# Patient Record
Sex: FEMALE | Race: WHITE | NOT HISPANIC OR LATINO | Employment: UNEMPLOYED | URBAN - NONMETROPOLITAN AREA
[De-identification: names, ages, dates, MRNs, and addresses within clinical notes are randomized per-mention and may not be internally consistent; named-entity substitution may affect disease eponyms.]

---

## 2017-04-03 ENCOUNTER — TELEPHONE (OUTPATIENT)
Dept: PSYCHIATRY | Facility: CLINIC | Age: 27
End: 2017-04-03

## 2017-04-03 NOTE — TELEPHONE ENCOUNTER
Dr. Gagan Peguero called and wanted to schedule an appointment with you. You have not seen her since 02/15/16 and she no showed with you on 02/25/16. Her chart has been closed since it's been over a year since you saw her. Do you want to schedule her or refer her to one of the NP's, but if she is wanting Suboxone they can't prescribe that. She told me she wasn't on any meds.

## 2017-04-04 NOTE — TELEPHONE ENCOUNTER
Called patient and made her aware that Dr. Farah could not take her back as a patient but she can schedule with a NP and she didn't want the appointment unless she could see a psychiatrist

## 2017-06-16 ENCOUNTER — HOSPITAL ENCOUNTER (EMERGENCY)
Facility: HOSPITAL | Age: 27
Discharge: LEFT BEFORE DISCHARGE | End: 2017-06-16

## 2017-06-16 VITALS
TEMPERATURE: 98.1 F | HEART RATE: 61 BPM | RESPIRATION RATE: 16 BRPM | OXYGEN SATURATION: 96 % | WEIGHT: 162 LBS | DIASTOLIC BLOOD PRESSURE: 74 MMHG | HEIGHT: 63 IN | BODY MASS INDEX: 28.7 KG/M2 | SYSTOLIC BLOOD PRESSURE: 108 MMHG

## 2017-06-16 PROCEDURE — 99211 OFF/OP EST MAY X REQ PHY/QHP: CPT

## 2017-06-17 NOTE — ED NOTES
pt was unable to be found in ED lobby, in bathroom or outside of ED doors. Pt appears to have left without notifying staff and without being seen.          Nayeli Morin RN  06/17/17 1943

## 2017-10-23 ENCOUNTER — HOSPITAL ENCOUNTER (EMERGENCY)
Facility: HOSPITAL | Age: 27
End: 2017-10-23

## 2017-11-12 ENCOUNTER — HOSPITAL ENCOUNTER (EMERGENCY)
Facility: HOSPITAL | Age: 27
Discharge: HOME | End: 2017-11-12
Attending: EMERGENCY MEDICINE | Admitting: EMERGENCY MEDICINE

## 2017-11-12 ENCOUNTER — APPOINTMENT (OUTPATIENT)
Dept: GENERAL RADIOLOGY | Facility: HOSPITAL | Age: 27
End: 2017-11-12

## 2017-11-12 VITALS
TEMPERATURE: 97.1 F | HEIGHT: 64 IN | SYSTOLIC BLOOD PRESSURE: 100 MMHG | RESPIRATION RATE: 18 BRPM | HEART RATE: 83 BPM | WEIGHT: 145 LBS | DIASTOLIC BLOOD PRESSURE: 70 MMHG | BODY MASS INDEX: 24.75 KG/M2 | OXYGEN SATURATION: 97 %

## 2017-11-12 DIAGNOSIS — L03.113 CELLULITIS OF RIGHT HAND: Primary | ICD-10-CM

## 2017-11-12 DIAGNOSIS — N39.0 ACUTE UTI: ICD-10-CM

## 2017-11-12 LAB
6-ACETYL MORPHINE: NEGATIVE
ALBUMIN SERPL-MCNC: 3.9 G/DL (ref 3.5–5)
ALBUMIN/GLOB SERPL: 1 G/DL (ref 1.5–2.5)
ALP SERPL-CCNC: 84 U/L (ref 35–104)
ALT SERPL W P-5'-P-CCNC: 12 U/L (ref 10–36)
AMPHET+METHAMPHET UR QL: NEGATIVE
ANION GAP SERPL CALCULATED.3IONS-SCNC: 5.2 MMOL/L (ref 3.6–11.2)
AST SERPL-CCNC: 18 U/L (ref 10–30)
BACTERIA UR QL AUTO: ABNORMAL /HPF
BARBITURATES UR QL SCN: POSITIVE
BASOPHILS # BLD AUTO: 0.02 10*3/MM3 (ref 0–0.3)
BASOPHILS NFR BLD AUTO: 0.4 % (ref 0–2)
BENZODIAZ UR QL SCN: NEGATIVE
BILIRUB SERPL-MCNC: 0.1 MG/DL (ref 0.2–1.8)
BILIRUB UR QL STRIP: NEGATIVE
BUN BLD-MCNC: 6 MG/DL (ref 7–21)
BUN/CREAT SERPL: 13 (ref 7–25)
BUPRENORPHINE SERPL-MCNC: NEGATIVE NG/ML
CALCIUM SPEC-SCNC: 9.1 MG/DL (ref 7.7–10)
CANNABINOIDS SERPL QL: NEGATIVE
CHLORIDE SERPL-SCNC: 108 MMOL/L (ref 99–112)
CLARITY UR: ABNORMAL
CO2 SERPL-SCNC: 24.8 MMOL/L (ref 24.3–31.9)
COCAINE UR QL: NEGATIVE
COLOR UR: ABNORMAL
CREAT BLD-MCNC: 0.46 MG/DL (ref 0.43–1.29)
CRP SERPL-MCNC: 3.44 MG/DL (ref 0–0.99)
DEPRECATED RDW RBC AUTO: 45.7 FL (ref 37–54)
EOSINOPHIL # BLD AUTO: 0.06 10*3/MM3 (ref 0–0.7)
EOSINOPHIL NFR BLD AUTO: 1.3 % (ref 0–5)
ERYTHROCYTE [DISTWIDTH] IN BLOOD BY AUTOMATED COUNT: 15.1 % (ref 11.5–14.5)
ERYTHROCYTE [SEDIMENTATION RATE] IN BLOOD: 46 MM/HR (ref 0–20)
GFR SERPL CREATININE-BSD FRML MDRD: >150 ML/MIN/1.73
GLOBULIN UR ELPH-MCNC: 3.8 GM/DL
GLUCOSE BLD-MCNC: 90 MG/DL (ref 70–110)
GLUCOSE UR STRIP-MCNC: NEGATIVE MG/DL
HCT VFR BLD AUTO: 35.1 % (ref 37–47)
HGB BLD-MCNC: 11.1 G/DL (ref 12–16)
HGB UR QL STRIP.AUTO: NEGATIVE
HYALINE CASTS UR QL AUTO: ABNORMAL /LPF
IMM GRANULOCYTES # BLD: 0 10*3/MM3 (ref 0–0.03)
IMM GRANULOCYTES NFR BLD: 0 % (ref 0–0.5)
KETONES UR QL STRIP: ABNORMAL
LEUKOCYTE ESTERASE UR QL STRIP.AUTO: ABNORMAL
LYMPHOCYTES # BLD AUTO: 1.63 10*3/MM3 (ref 1–3)
LYMPHOCYTES NFR BLD AUTO: 35.4 % (ref 21–51)
MCH RBC QN AUTO: 26.6 PG (ref 27–33)
MCHC RBC AUTO-ENTMCNC: 31.6 G/DL (ref 33–37)
MCV RBC AUTO: 84.2 FL (ref 80–94)
METHADONE UR QL SCN: NEGATIVE
MONOCYTES # BLD AUTO: 0.52 10*3/MM3 (ref 0.1–0.9)
MONOCYTES NFR BLD AUTO: 11.3 % (ref 0–10)
NEUTROPHILS # BLD AUTO: 2.37 10*3/MM3 (ref 1.4–6.5)
NEUTROPHILS NFR BLD AUTO: 51.6 % (ref 30–70)
NITRITE UR QL STRIP: NEGATIVE
OPIATES UR QL: POSITIVE
OSMOLALITY SERPL CALC.SUM OF ELEC: 272.8 MOSM/KG (ref 273–305)
OXYCODONE UR QL SCN: POSITIVE
PCP UR QL SCN: NEGATIVE
PH UR STRIP.AUTO: 6 [PH] (ref 5–8)
PLATELET # BLD AUTO: 247 10*3/MM3 (ref 130–400)
PMV BLD AUTO: 10.9 FL (ref 6–10)
POTASSIUM BLD-SCNC: 3.9 MMOL/L (ref 3.5–5.3)
PROT SERPL-MCNC: 7.7 G/DL (ref 6–8)
PROT UR QL STRIP: ABNORMAL
RBC # BLD AUTO: 4.17 10*6/MM3 (ref 4.2–5.4)
RBC # UR: ABNORMAL /HPF
REF LAB TEST METHOD: ABNORMAL
SODIUM BLD-SCNC: 138 MMOL/L (ref 135–153)
SP GR UR STRIP: >=1.03 (ref 1–1.03)
SQUAMOUS #/AREA URNS HPF: ABNORMAL /HPF
UROBILINOGEN UR QL STRIP: ABNORMAL
WBC NRBC COR # BLD: 4.6 10*3/MM3 (ref 4.5–12.5)
WBC UR QL AUTO: ABNORMAL /HPF

## 2017-11-12 PROCEDURE — 81001 URINALYSIS AUTO W/SCOPE: CPT | Performed by: PHYSICIAN ASSISTANT

## 2017-11-12 PROCEDURE — 80307 DRUG TEST PRSMV CHEM ANLYZR: CPT | Performed by: PHYSICIAN ASSISTANT

## 2017-11-12 PROCEDURE — 86140 C-REACTIVE PROTEIN: CPT | Performed by: PHYSICIAN ASSISTANT

## 2017-11-12 PROCEDURE — 73130 X-RAY EXAM OF HAND: CPT | Performed by: RADIOLOGY

## 2017-11-12 PROCEDURE — 85025 COMPLETE CBC W/AUTO DIFF WBC: CPT | Performed by: PHYSICIAN ASSISTANT

## 2017-11-12 PROCEDURE — 80053 COMPREHEN METABOLIC PANEL: CPT | Performed by: PHYSICIAN ASSISTANT

## 2017-11-12 PROCEDURE — 99283 EMERGENCY DEPT VISIT LOW MDM: CPT

## 2017-11-12 PROCEDURE — 85652 RBC SED RATE AUTOMATED: CPT | Performed by: PHYSICIAN ASSISTANT

## 2017-11-12 PROCEDURE — 87086 URINE CULTURE/COLONY COUNT: CPT | Performed by: PHYSICIAN ASSISTANT

## 2017-11-12 PROCEDURE — 73130 X-RAY EXAM OF HAND: CPT

## 2017-11-12 RX ORDER — IBUPROFEN 600 MG/1
600 TABLET ORAL EVERY 6 HOURS PRN
Qty: 60 TABLET | Refills: 0 | Status: SHIPPED | OUTPATIENT
Start: 2017-11-12

## 2017-11-12 RX ORDER — METHOCARBAMOL 500 MG/1
500 TABLET, FILM COATED ORAL DAILY
COMMUNITY

## 2017-11-12 RX ORDER — DOXYCYCLINE 100 MG/1
100 CAPSULE ORAL 2 TIMES DAILY
Qty: 20 CAPSULE | Refills: 0 | Status: SHIPPED | OUTPATIENT
Start: 2017-11-12

## 2017-11-12 RX ORDER — CLONIDINE HYDROCHLORIDE 0.1 MG/1
0.1 TABLET ORAL 2 TIMES DAILY
COMMUNITY

## 2017-11-12 RX ORDER — BUTALBITAL, ACETAMINOPHEN AND CAFFEINE 50; 325; 40 MG/1; MG/1; MG/1
1 TABLET ORAL EVERY 4 HOURS PRN
COMMUNITY

## 2017-11-12 RX ORDER — TIZANIDINE 4 MG/1
4 TABLET ORAL NIGHTLY PRN
COMMUNITY

## 2017-11-12 NOTE — ED PROVIDER NOTES
Subjective   HPI Comments: 27-year-old female comes in with chief complaint right hand numbness, throbbing, pain.  Patient states that approximately 2 weeks ago she injected IV drug use and to her right hand.  States she's had increase swelling, pain in the hand.  There is been no obvious abscess or infection.  She does states she's had numbness and tingling in her fingertips.    Patient is a 27 y.o. female presenting with upper extremity pain.   History provided by:  Patient   used: No    Upper Extremity Issue   Location:  Hand  Hand location:  R hand  Injury: yes    Time since incident:  2 days  Mechanism of injury comment:  IV drugs   Pain details:     Quality:  Throbbing and shooting    Radiates to:  Does not radiate    Severity:  Moderate    Onset quality:  Sudden    Duration:  2 days    Timing:  Constant  Dislocation: no    Foreign body present:  No foreign bodies  Tetanus status:  Unknown  Prior injury to area:  No  Relieved by:  Nothing  Worsened by:  Nothing  Associated symptoms: muscle weakness, numbness, stiffness and swelling    Associated symptoms: no back pain, no decreased range of motion and no fatigue    Risk factors: no concern for non-accidental trauma, no known bone disorder, no frequent fractures and no recent illness        Review of Systems   Constitutional: Negative for fatigue.   Musculoskeletal: Positive for stiffness. Negative for back pain.   Skin: Positive for wound.   Neurological: Positive for weakness.   All other systems reviewed and are negative.      Past Medical History:   Diagnosis Date   • Anxiety    • Arthritis    • Injury of back    • Migraine        Allergies   Allergen Reactions   • Cephalexin    • Tramadol Hcl    • Bactrim [Sulfamethoxazole-Trimethoprim] Rash       History reviewed. No pertinent surgical history.    Family History   Problem Relation Age of Onset   • Anxiety disorder Mother    • Arthritis Mother    • Heart disease Paternal Grandfather     • Stroke Paternal Grandfather    • Lung disease Paternal Grandmother    • Breast cancer Maternal Grandmother    • Anxiety disorder Maternal Grandmother    • Arthritis Maternal Grandmother    • Hypertension Maternal Grandmother        Social History     Social History   • Marital status:      Spouse name: N/A   • Number of children: N/A   • Years of education: N/A     Social History Main Topics   • Smoking status: Current Every Day Smoker     Packs/day: 0.50   • Smokeless tobacco: Never Used   • Alcohol use No   • Drug use: Yes     Special: IV      Comment: oxycodone 30mg iv daily but trying to quit; last intake approx 2wks ago; snorted last 3 days ago   • Sexual activity: No     Other Topics Concern   • None     Social History Narrative   • None           Objective   Physical Exam   Constitutional: She is oriented to person, place, and time. She appears well-developed and well-nourished.   HENT:   Head: Normocephalic.   Right Ear: External ear normal.   Left Ear: External ear normal.   Nose: Nose normal.   Mouth/Throat: Oropharynx is clear and moist.   Eyes: Conjunctivae and EOM are normal. Pupils are equal, round, and reactive to light.   Neck: Normal range of motion. Neck supple. No tracheal deviation present. No thyromegaly present.   Cardiovascular: Normal rate, regular rhythm, normal heart sounds and intact distal pulses.    Pulmonary/Chest: Effort normal and breath sounds normal.   Abdominal: Soft. Bowel sounds are normal.   Musculoskeletal: She exhibits edema and tenderness.        Right hand: She exhibits decreased range of motion and tenderness. She exhibits no deformity.   Neurological: She is alert and oriented to person, place, and time. She has normal reflexes.   Skin: Skin is warm and dry.   Psychiatric: She has a normal mood and affect. Her behavior is normal. Judgment and thought content normal.   Nursing note and vitals reviewed.      Procedures         ED Course  ED Course   Comment By  Time   This is a 27-year-old female comes in with chief complaint right hand swelling and tingling in her fingertips.  Patient does report she attempted to inject IV drugs several weeks ago. Patient will be started on abx for mild cellulitis and the swelling, as well as UTI. Advised to return if condition worsens. Steven Sesay PA-C 11/12 2052                  MDM  Number of Diagnoses or Management Options  Acute UTI: new and requires workup  Cellulitis of right hand: new and requires workup     Amount and/or Complexity of Data Reviewed  Independent visualization of images, tracings, or specimens: yes    Risk of Complications, Morbidity, and/or Mortality  Presenting problems: moderate  Diagnostic procedures: moderate  Management options: moderate    Patient Progress  Patient progress: stable      Final diagnoses:   Cellulitis of right hand   Acute UTI            Steven Sesay PA-C  11/12/17 2057       Steven Sesay PA-C  11/12/17 2109

## 2017-11-15 LAB — BACTERIA SPEC AEROBE CULT: NORMAL

## 2018-11-23 ENCOUNTER — HOSPITAL ENCOUNTER (EMERGENCY)
Facility: HOSPITAL | Age: 28
Discharge: LEFT BEFORE DISCHARGE | End: 2018-11-24

## 2025-03-27 ENCOUNTER — HOSPITAL ENCOUNTER (INPATIENT)
Facility: HOSPITAL | Age: 35
LOS: 2 days | Discharge: HOME | DRG: 690 | End: 2025-03-30
Attending: EMERGENCY MEDICINE | Admitting: HOSPITALIST
Payer: MEDICAID

## 2025-03-27 ENCOUNTER — APPOINTMENT (OUTPATIENT)
Dept: CT IMAGING | Facility: HOSPITAL | Age: 35
DRG: 690 | End: 2025-03-27
Payer: MEDICAID

## 2025-03-27 DIAGNOSIS — N10 ACUTE PYELONEPHRITIS: Primary | ICD-10-CM

## 2025-03-27 PROCEDURE — 99285 EMERGENCY DEPT VISIT HI MDM: CPT

## 2025-03-27 RX ORDER — ONDANSETRON 4 MG/1
4 TABLET, ORALLY DISINTEGRATING ORAL ONCE
Status: COMPLETED | OUTPATIENT
Start: 2025-03-28 | End: 2025-03-28

## 2025-03-27 RX ORDER — SODIUM CHLORIDE 0.9 % (FLUSH) 0.9 %
10 SYRINGE (ML) INJECTION AS NEEDED
Status: DISCONTINUED | OUTPATIENT
Start: 2025-03-27 | End: 2025-03-27

## 2025-03-27 RX ORDER — KETOROLAC TROMETHAMINE 30 MG/ML
60 INJECTION, SOLUTION INTRAMUSCULAR; INTRAVENOUS ONCE
Status: COMPLETED | OUTPATIENT
Start: 2025-03-28 | End: 2025-03-28

## 2025-03-27 RX ORDER — ONDANSETRON 2 MG/ML
4 INJECTION INTRAMUSCULAR; INTRAVENOUS ONCE
Status: DISCONTINUED | OUTPATIENT
Start: 2025-03-27 | End: 2025-03-27

## 2025-03-27 RX ORDER — KETOROLAC TROMETHAMINE 30 MG/ML
30 INJECTION, SOLUTION INTRAMUSCULAR; INTRAVENOUS ONCE
Status: DISCONTINUED | OUTPATIENT
Start: 2025-03-27 | End: 2025-03-27

## 2025-03-28 ENCOUNTER — APPOINTMENT (OUTPATIENT)
Dept: ULTRASOUND IMAGING | Facility: HOSPITAL | Age: 35
DRG: 690 | End: 2025-03-28
Payer: MEDICAID

## 2025-03-28 ENCOUNTER — APPOINTMENT (OUTPATIENT)
Dept: CT IMAGING | Facility: HOSPITAL | Age: 35
DRG: 690 | End: 2025-03-28
Payer: MEDICAID

## 2025-03-28 PROBLEM — N12 PYELONEPHRITIS: Status: ACTIVE | Noted: 2025-03-28

## 2025-03-28 LAB
ALBUMIN SERPL-MCNC: 2 G/DL (ref 3.5–5.2)
ALBUMIN SERPL-MCNC: 2.7 G/DL (ref 3.5–5.2)
ALBUMIN/GLOB SERPL: 0.6 G/DL
ALBUMIN/GLOB SERPL: 0.8 G/DL
ALP SERPL-CCNC: 131 U/L (ref 39–117)
ALP SERPL-CCNC: 166 U/L (ref 39–117)
ALT SERPL W P-5'-P-CCNC: 17 U/L (ref 1–33)
ALT SERPL W P-5'-P-CCNC: 18 U/L (ref 1–33)
AMPHET+METHAMPHET UR QL: POSITIVE
AMPHETAMINES UR QL: POSITIVE
ANION GAP SERPL CALCULATED.3IONS-SCNC: 11.9 MMOL/L (ref 5–15)
ANION GAP SERPL CALCULATED.3IONS-SCNC: 12.2 MMOL/L (ref 5–15)
AST SERPL-CCNC: 19 U/L (ref 1–32)
AST SERPL-CCNC: 20 U/L (ref 1–32)
BACTERIA UR QL AUTO: ABNORMAL /HPF
BARBITURATES UR QL SCN: NEGATIVE
BASOPHILS # BLD AUTO: 0.04 10*3/MM3 (ref 0–0.2)
BASOPHILS # BLD AUTO: 0.04 10*3/MM3 (ref 0–0.2)
BASOPHILS NFR BLD AUTO: 0.4 % (ref 0–1.5)
BASOPHILS NFR BLD AUTO: 0.8 % (ref 0–1.5)
BENZODIAZ UR QL SCN: NEGATIVE
BILIRUB SERPL-MCNC: 0.3 MG/DL (ref 0–1.2)
BILIRUB SERPL-MCNC: 0.3 MG/DL (ref 0–1.2)
BILIRUB UR QL STRIP: NEGATIVE
BUN SERPL-MCNC: 19 MG/DL (ref 6–20)
BUN SERPL-MCNC: 20 MG/DL (ref 6–20)
BUN/CREAT SERPL: 13.7 (ref 7–25)
BUN/CREAT SERPL: 14 (ref 7–25)
BUPRENORPHINE SERPL-MCNC: POSITIVE NG/ML
BURR CELLS BLD QL SMEAR: NORMAL
CALCIUM SPEC-SCNC: 7.9 MG/DL (ref 8.6–10.5)
CALCIUM SPEC-SCNC: 8.6 MG/DL (ref 8.6–10.5)
CANNABINOIDS SERPL QL: NEGATIVE
CHLORIDE SERPL-SCNC: 105 MMOL/L (ref 98–107)
CHLORIDE SERPL-SCNC: 109 MMOL/L (ref 98–107)
CLARITY UR: ABNORMAL
CO2 SERPL-SCNC: 16.8 MMOL/L (ref 22–29)
CO2 SERPL-SCNC: 22.1 MMOL/L (ref 22–29)
COCAINE UR QL: NEGATIVE
COLOR UR: YELLOW
CREAT SERPL-MCNC: 1.36 MG/DL (ref 0.57–1)
CREAT SERPL-MCNC: 1.46 MG/DL (ref 0.57–1)
CRP SERPL-MCNC: 17.83 MG/DL (ref 0–0.5)
CRP SERPL-MCNC: 22.51 MG/DL (ref 0–0.5)
D-LACTATE SERPL-SCNC: 0.8 MMOL/L (ref 0.5–2)
DEPRECATED RDW RBC AUTO: 42.5 FL (ref 37–54)
DEPRECATED RDW RBC AUTO: 46.8 FL (ref 37–54)
DOHLE BOD BLD QL SMEAR: PRESENT
EGFRCR SERPLBLD CKD-EPI 2021: 47.6 ML/MIN/1.73
EGFRCR SERPLBLD CKD-EPI 2021: 51.9 ML/MIN/1.73
EOSINOPHIL # BLD AUTO: 0.04 10*3/MM3 (ref 0–0.4)
EOSINOPHIL # BLD AUTO: 0.08 10*3/MM3 (ref 0–0.4)
EOSINOPHIL NFR BLD AUTO: 0.8 % (ref 0.3–6.2)
EOSINOPHIL NFR BLD AUTO: 0.9 % (ref 0.3–6.2)
ERYTHROCYTE [DISTWIDTH] IN BLOOD BY AUTOMATED COUNT: 14.2 % (ref 12.3–15.4)
ERYTHROCYTE [DISTWIDTH] IN BLOOD BY AUTOMATED COUNT: 14.6 % (ref 12.3–15.4)
FENTANYL UR-MCNC: NEGATIVE NG/ML
GLOBULIN UR ELPH-MCNC: 3.5 GM/DL
GLOBULIN UR ELPH-MCNC: 3.6 GM/DL
GLUCOSE SERPL-MCNC: 120 MG/DL (ref 65–99)
GLUCOSE SERPL-MCNC: 138 MG/DL (ref 65–99)
GLUCOSE UR STRIP-MCNC: NEGATIVE MG/DL
HCG SERPL QL: NEGATIVE
HCT VFR BLD AUTO: 30.7 % (ref 34–46.6)
HCT VFR BLD AUTO: 46.9 % (ref 34–46.6)
HGB BLD-MCNC: 15.4 G/DL (ref 12–15.9)
HGB BLD-MCNC: 9.7 G/DL (ref 12–15.9)
HGB UR QL STRIP.AUTO: ABNORMAL
HOLD SPECIMEN: NORMAL
HYALINE CASTS UR QL AUTO: ABNORMAL /LPF
HYPOCHROMIA BLD QL: NORMAL
IMM GRANULOCYTES # BLD AUTO: 0.02 10*3/MM3 (ref 0–0.05)
IMM GRANULOCYTES # BLD AUTO: 0.05 10*3/MM3 (ref 0–0.05)
IMM GRANULOCYTES NFR BLD AUTO: 0.4 % (ref 0–0.5)
IMM GRANULOCYTES NFR BLD AUTO: 0.5 % (ref 0–0.5)
KETONES UR QL STRIP: NEGATIVE
LEUKOCYTE ESTERASE UR QL STRIP.AUTO: ABNORMAL
LIPASE SERPL-CCNC: 8 U/L (ref 13–60)
LYMPHOCYTES # BLD AUTO: 0.31 10*3/MM3 (ref 0.7–3.1)
LYMPHOCYTES # BLD AUTO: 0.59 10*3/MM3 (ref 0.7–3.1)
LYMPHOCYTES NFR BLD AUTO: 5.9 % (ref 19.6–45.3)
LYMPHOCYTES NFR BLD AUTO: 6.4 % (ref 19.6–45.3)
MAGNESIUM SERPL-MCNC: 1.8 MG/DL (ref 1.6–2.6)
MCH RBC QN AUTO: 27.1 PG (ref 26.6–33)
MCH RBC QN AUTO: 27.4 PG (ref 26.6–33)
MCHC RBC AUTO-ENTMCNC: 31.6 G/DL (ref 31.5–35.7)
MCHC RBC AUTO-ENTMCNC: 32.8 G/DL (ref 31.5–35.7)
MCV RBC AUTO: 82.4 FL (ref 79–97)
MCV RBC AUTO: 86.7 FL (ref 79–97)
METHADONE UR QL SCN: NEGATIVE
MONOCYTES # BLD AUTO: 0.5 10*3/MM3 (ref 0.1–0.9)
MONOCYTES # BLD AUTO: 0.6 10*3/MM3 (ref 0.1–0.9)
MONOCYTES NFR BLD AUTO: 6.5 % (ref 5–12)
MONOCYTES NFR BLD AUTO: 9.5 % (ref 5–12)
NEUTROPHILS NFR BLD AUTO: 4.36 10*3/MM3 (ref 1.7–7)
NEUTROPHILS NFR BLD AUTO: 7.89 10*3/MM3 (ref 1.7–7)
NEUTROPHILS NFR BLD AUTO: 82.6 % (ref 42.7–76)
NEUTROPHILS NFR BLD AUTO: 85.3 % (ref 42.7–76)
NITRITE UR QL STRIP: POSITIVE
NRBC BLD AUTO-RTO: 0 /100 WBC (ref 0–0.2)
NRBC BLD AUTO-RTO: 0 /100 WBC (ref 0–0.2)
OPIATES UR QL: NEGATIVE
OXYCODONE UR QL SCN: NEGATIVE
PCP UR QL SCN: NEGATIVE
PH UR STRIP.AUTO: 6 [PH] (ref 5–8)
PLAT MORPH BLD: NORMAL
PLAT MORPH BLD: NORMAL
PLATELET # BLD AUTO: 127 10*3/MM3 (ref 140–450)
PLATELET # BLD AUTO: 185 10*3/MM3 (ref 140–450)
PMV BLD AUTO: 10.4 FL (ref 6–12)
PMV BLD AUTO: 10.7 FL (ref 6–12)
POTASSIUM SERPL-SCNC: 2.8 MMOL/L (ref 3.5–5.2)
POTASSIUM SERPL-SCNC: 3.4 MMOL/L (ref 3.5–5.2)
PROCALCITONIN SERPL-MCNC: 4.86 NG/ML (ref 0–0.25)
PROT SERPL-MCNC: 5.6 G/DL (ref 6–8.5)
PROT SERPL-MCNC: 6.2 G/DL (ref 6–8.5)
PROT UR QL STRIP: ABNORMAL
QT INTERVAL: 330 MS
QTC INTERVAL: 423 MS
RBC # BLD AUTO: 3.54 10*6/MM3 (ref 3.77–5.28)
RBC # BLD AUTO: 5.69 10*6/MM3 (ref 3.77–5.28)
RBC # UR STRIP: ABNORMAL /HPF
RBC MORPH BLD: NORMAL
REF LAB TEST METHOD: ABNORMAL
SODIUM SERPL-SCNC: 138 MMOL/L (ref 136–145)
SODIUM SERPL-SCNC: 139 MMOL/L (ref 136–145)
SP GR UR STRIP: 1.02 (ref 1–1.03)
SQUAMOUS #/AREA URNS HPF: ABNORMAL /HPF
TRICYCLICS UR QL SCN: NEGATIVE
UROBILINOGEN UR QL STRIP: ABNORMAL
WBC # UR STRIP: ABNORMAL /HPF
WBC NRBC COR # BLD AUTO: 5.27 10*3/MM3 (ref 3.4–10.8)
WBC NRBC COR # BLD AUTO: 9.25 10*3/MM3 (ref 3.4–10.8)
WHOLE BLOOD HOLD COAG: NORMAL
WHOLE BLOOD HOLD SPECIMEN: NORMAL

## 2025-03-28 PROCEDURE — 85007 BL SMEAR W/DIFF WBC COUNT: CPT | Performed by: EMERGENCY MEDICINE

## 2025-03-28 PROCEDURE — 85025 COMPLETE CBC W/AUTO DIFF WBC: CPT | Performed by: HOSPITALIST

## 2025-03-28 PROCEDURE — 83690 ASSAY OF LIPASE: CPT | Performed by: EMERGENCY MEDICINE

## 2025-03-28 PROCEDURE — 74176 CT ABD & PELVIS W/O CONTRAST: CPT | Performed by: RADIOLOGY

## 2025-03-28 PROCEDURE — 25810000003 LACTATED RINGERS PER 1000 ML: Performed by: INTERNAL MEDICINE

## 2025-03-28 PROCEDURE — 25010000002 VANCOMYCIN 5 G RECONSTITUTED SOLUTION: Performed by: HOSPITALIST

## 2025-03-28 PROCEDURE — 81001 URINALYSIS AUTO W/SCOPE: CPT | Performed by: EMERGENCY MEDICINE

## 2025-03-28 PROCEDURE — 25810000003 SODIUM CHLORIDE 0.9 % SOLUTION: Performed by: HOSPITALIST

## 2025-03-28 PROCEDURE — 93005 ELECTROCARDIOGRAM TRACING: CPT | Performed by: HOSPITALIST

## 2025-03-28 PROCEDURE — 25010000002 AZTREONAM PER 500 MG

## 2025-03-28 PROCEDURE — 87040 BLOOD CULTURE FOR BACTERIA: CPT

## 2025-03-28 PROCEDURE — 25010000002 HEPARIN (PORCINE) PER 1000 UNITS: Performed by: HOSPITALIST

## 2025-03-28 PROCEDURE — 76705 ECHO EXAM OF ABDOMEN: CPT

## 2025-03-28 PROCEDURE — 25010000002 GENTAMICIN PER 80 MG: Performed by: NURSE PRACTITIONER

## 2025-03-28 PROCEDURE — 96361 HYDRATE IV INFUSION ADD-ON: CPT

## 2025-03-28 PROCEDURE — 36415 COLL VENOUS BLD VENIPUNCTURE: CPT

## 2025-03-28 PROCEDURE — 63710000001 ONDANSETRON ODT 4 MG TABLET DISPERSIBLE: Performed by: EMERGENCY MEDICINE

## 2025-03-28 PROCEDURE — 80307 DRUG TEST PRSMV CHEM ANLYZR: CPT | Performed by: HOSPITALIST

## 2025-03-28 PROCEDURE — 86140 C-REACTIVE PROTEIN: CPT

## 2025-03-28 PROCEDURE — 83605 ASSAY OF LACTIC ACID: CPT

## 2025-03-28 PROCEDURE — 74176 CT ABD & PELVIS W/O CONTRAST: CPT

## 2025-03-28 PROCEDURE — 76705 ECHO EXAM OF ABDOMEN: CPT | Performed by: RADIOLOGY

## 2025-03-28 PROCEDURE — 99223 1ST HOSP IP/OBS HIGH 75: CPT

## 2025-03-28 PROCEDURE — 25010000002 KETOROLAC TROMETHAMINE PER 15 MG: Performed by: EMERGENCY MEDICINE

## 2025-03-28 PROCEDURE — 83735 ASSAY OF MAGNESIUM: CPT | Performed by: EMERGENCY MEDICINE

## 2025-03-28 PROCEDURE — 25810000003 SODIUM CHLORIDE 0.9 % SOLUTION

## 2025-03-28 PROCEDURE — 85025 COMPLETE CBC W/AUTO DIFF WBC: CPT | Performed by: EMERGENCY MEDICINE

## 2025-03-28 PROCEDURE — 84145 PROCALCITONIN (PCT): CPT | Performed by: HOSPITALIST

## 2025-03-28 PROCEDURE — 86140 C-REACTIVE PROTEIN: CPT | Performed by: HOSPITALIST

## 2025-03-28 PROCEDURE — 96365 THER/PROPH/DIAG IV INF INIT: CPT

## 2025-03-28 PROCEDURE — 93010 ELECTROCARDIOGRAM REPORT: CPT | Performed by: INTERNAL MEDICINE

## 2025-03-28 PROCEDURE — 84703 CHORIONIC GONADOTROPIN ASSAY: CPT | Performed by: EMERGENCY MEDICINE

## 2025-03-28 PROCEDURE — 99254 IP/OBS CNSLTJ NEW/EST MOD 60: CPT | Performed by: NURSE PRACTITIONER

## 2025-03-28 PROCEDURE — 85007 BL SMEAR W/DIFF WBC COUNT: CPT | Performed by: HOSPITALIST

## 2025-03-28 PROCEDURE — 80053 COMPREHEN METABOLIC PANEL: CPT | Performed by: HOSPITALIST

## 2025-03-28 PROCEDURE — 96375 TX/PRO/DX INJ NEW DRUG ADDON: CPT

## 2025-03-28 PROCEDURE — 80053 COMPREHEN METABOLIC PANEL: CPT | Performed by: EMERGENCY MEDICINE

## 2025-03-28 PROCEDURE — 96372 THER/PROPH/DIAG INJ SC/IM: CPT

## 2025-03-28 PROCEDURE — 25010000002 CEFTRIAXONE PER 250 MG: Performed by: NURSE PRACTITIONER

## 2025-03-28 PROCEDURE — 87086 URINE CULTURE/COLONY COUNT: CPT | Performed by: EMERGENCY MEDICINE

## 2025-03-28 RX ORDER — POTASSIUM CHLORIDE 1500 MG/1
40 TABLET, EXTENDED RELEASE ORAL ONCE
Status: COMPLETED | OUTPATIENT
Start: 2025-03-28 | End: 2025-03-28

## 2025-03-28 RX ORDER — AMOXICILLIN 250 MG
2 CAPSULE ORAL 2 TIMES DAILY PRN
Status: DISCONTINUED | OUTPATIENT
Start: 2025-03-28 | End: 2025-03-30 | Stop reason: HOSPADM

## 2025-03-28 RX ORDER — LEVOTHYROXINE SODIUM 50 UG/1
50 TABLET ORAL
COMMUNITY

## 2025-03-28 RX ORDER — BUPRENORPHINE HYDROCHLORIDE AND NALOXONE HYDROCHLORIDE DIHYDRATE 8; 2 MG/1; MG/1
1 TABLET SUBLINGUAL DAILY
Status: CANCELLED | OUTPATIENT
Start: 2025-03-28

## 2025-03-28 RX ORDER — POLYETHYLENE GLYCOL 3350 17 G/17G
17 POWDER, FOR SOLUTION ORAL DAILY PRN
Status: DISCONTINUED | OUTPATIENT
Start: 2025-03-28 | End: 2025-03-30 | Stop reason: HOSPADM

## 2025-03-28 RX ORDER — BENZONATATE 100 MG/1
200 CAPSULE ORAL 3 TIMES DAILY PRN
Status: DISCONTINUED | OUTPATIENT
Start: 2025-03-28 | End: 2025-03-30 | Stop reason: HOSPADM

## 2025-03-28 RX ORDER — VANCOMYCIN/0.9 % SOD CHLORIDE 750 MG/250
750 PLASTIC BAG, INJECTION (ML) INTRAVENOUS EVERY 12 HOURS
Status: DISCONTINUED | OUTPATIENT
Start: 2025-03-28 | End: 2025-03-28

## 2025-03-28 RX ORDER — SODIUM CHLORIDE 0.9 % (FLUSH) 0.9 %
10 SYRINGE (ML) INJECTION AS NEEDED
Status: DISCONTINUED | OUTPATIENT
Start: 2025-03-28 | End: 2025-03-30 | Stop reason: HOSPADM

## 2025-03-28 RX ORDER — PANTOPRAZOLE SODIUM 40 MG/1
40 TABLET, DELAYED RELEASE ORAL
Status: DISCONTINUED | OUTPATIENT
Start: 2025-03-28 | End: 2025-03-30 | Stop reason: HOSPADM

## 2025-03-28 RX ORDER — SODIUM CHLORIDE, SODIUM LACTATE, POTASSIUM CHLORIDE, CALCIUM CHLORIDE 600; 310; 30; 20 MG/100ML; MG/100ML; MG/100ML; MG/100ML
100 INJECTION, SOLUTION INTRAVENOUS CONTINUOUS
Status: ACTIVE | OUTPATIENT
Start: 2025-03-28 | End: 2025-03-29

## 2025-03-28 RX ORDER — ONDANSETRON 4 MG/1
4 TABLET, ORALLY DISINTEGRATING ORAL EVERY 8 HOURS PRN
Status: DISCONTINUED | OUTPATIENT
Start: 2025-03-28 | End: 2025-03-30 | Stop reason: HOSPADM

## 2025-03-28 RX ORDER — LEVOTHYROXINE SODIUM 50 UG/1
50 TABLET ORAL
Status: DISCONTINUED | OUTPATIENT
Start: 2025-03-28 | End: 2025-03-30 | Stop reason: HOSPADM

## 2025-03-28 RX ORDER — HEPARIN SODIUM 5000 [USP'U]/ML
5000 INJECTION, SOLUTION INTRAVENOUS; SUBCUTANEOUS EVERY 12 HOURS SCHEDULED
Status: DISCONTINUED | OUTPATIENT
Start: 2025-03-28 | End: 2025-03-30 | Stop reason: HOSPADM

## 2025-03-28 RX ORDER — ELECTROLYTES/DEXTROSE
100 SOLUTION, ORAL ORAL DAILY
COMMUNITY

## 2025-03-28 RX ORDER — OMEGA-3 FATTY ACIDS/FISH OIL 300-1000MG
1000 CAPSULE ORAL DAILY
COMMUNITY

## 2025-03-28 RX ORDER — SODIUM CHLORIDE 0.9 % (FLUSH) 0.9 %
10 SYRINGE (ML) INJECTION EVERY 12 HOURS SCHEDULED
Status: DISCONTINUED | OUTPATIENT
Start: 2025-03-28 | End: 2025-03-30 | Stop reason: HOSPADM

## 2025-03-28 RX ORDER — SODIUM CHLORIDE 9 MG/ML
100 INJECTION, SOLUTION INTRAVENOUS CONTINUOUS
Status: DISCONTINUED | OUTPATIENT
Start: 2025-03-28 | End: 2025-03-28

## 2025-03-28 RX ORDER — NICOTINE 21 MG/24HR
1 PATCH, TRANSDERMAL 24 HOURS TRANSDERMAL DAILY PRN
Status: DISCONTINUED | OUTPATIENT
Start: 2025-03-28 | End: 2025-03-30 | Stop reason: HOSPADM

## 2025-03-28 RX ORDER — LANOLIN ALCOHOL/MO/W.PET/CERES
100 CREAM (GRAM) TOPICAL DAILY
Status: DISCONTINUED | OUTPATIENT
Start: 2025-03-28 | End: 2025-03-30 | Stop reason: HOSPADM

## 2025-03-28 RX ORDER — PHENOL 1.4 %
600 AEROSOL, SPRAY (ML) MUCOUS MEMBRANE DAILY
COMMUNITY

## 2025-03-28 RX ORDER — MULTIVITAMIN WITH IRON
500 TABLET ORAL DAILY
COMMUNITY

## 2025-03-28 RX ORDER — BENZONATATE 200 MG/1
200 CAPSULE ORAL 3 TIMES DAILY PRN
COMMUNITY

## 2025-03-28 RX ORDER — LANOLIN ALCOHOL/MO/W.PET/CERES
500 CREAM (GRAM) TOPICAL DAILY
Status: DISCONTINUED | OUTPATIENT
Start: 2025-03-28 | End: 2025-03-30 | Stop reason: HOSPADM

## 2025-03-28 RX ORDER — BUPROPION HYDROCHLORIDE 150 MG/1
150 TABLET ORAL DAILY
Status: DISCONTINUED | OUTPATIENT
Start: 2025-03-28 | End: 2025-03-30 | Stop reason: HOSPADM

## 2025-03-28 RX ORDER — PRAZIQUANTEL 600 MG/1
600 TABLET, FILM COATED ORAL ONCE
Qty: 1 TABLET | Refills: 0 | Status: SHIPPED | OUTPATIENT
Start: 2025-03-28 | End: 2025-03-28

## 2025-03-28 RX ORDER — CHOLECALCIFEROL (VITAMIN D3) 25 MCG
1000 TABLET ORAL DAILY
Status: DISCONTINUED | OUTPATIENT
Start: 2025-03-28 | End: 2025-03-30 | Stop reason: HOSPADM

## 2025-03-28 RX ORDER — CHOLECALCIFEROL (VITAMIN D3) 25 MCG
1000 TABLET ORAL DAILY
COMMUNITY

## 2025-03-28 RX ORDER — FLUTICASONE PROPIONATE 50 MCG
2 SPRAY, SUSPENSION (ML) NASAL DAILY
Status: DISCONTINUED | OUTPATIENT
Start: 2025-03-28 | End: 2025-03-30 | Stop reason: HOSPADM

## 2025-03-28 RX ORDER — FLUTICASONE PROPIONATE 50 MCG
2 SPRAY, SUSPENSION (ML) NASAL DAILY
COMMUNITY

## 2025-03-28 RX ORDER — BUPROPION HYDROCHLORIDE 150 MG/1
150 TABLET ORAL DAILY
COMMUNITY

## 2025-03-28 RX ORDER — ESOMEPRAZOLE MAGNESIUM 40 MG/1
40 CAPSULE, DELAYED RELEASE ORAL
COMMUNITY

## 2025-03-28 RX ORDER — ONDANSETRON 4 MG/1
4 TABLET, ORALLY DISINTEGRATING ORAL EVERY 8 HOURS PRN
COMMUNITY

## 2025-03-28 RX ORDER — ASCORBIC ACID 500 MG
500 TABLET ORAL DAILY
COMMUNITY

## 2025-03-28 RX ORDER — BUPRENORPHINE AND NALOXONE 8; 2 MG/1; MG/1
1 FILM, SOLUBLE BUCCAL; SUBLINGUAL DAILY
COMMUNITY

## 2025-03-28 RX ORDER — SODIUM CHLORIDE 9 MG/ML
40 INJECTION, SOLUTION INTRAVENOUS AS NEEDED
Status: DISCONTINUED | OUTPATIENT
Start: 2025-03-28 | End: 2025-03-30 | Stop reason: HOSPADM

## 2025-03-28 RX ORDER — NICOTINE 21 MG/24HR
1 PATCH, TRANSDERMAL 24 HOURS TRANSDERMAL DAILY PRN
COMMUNITY

## 2025-03-28 RX ORDER — BISACODYL 5 MG/1
5 TABLET, DELAYED RELEASE ORAL DAILY PRN
Status: DISCONTINUED | OUTPATIENT
Start: 2025-03-28 | End: 2025-03-30 | Stop reason: HOSPADM

## 2025-03-28 RX ORDER — IBUPROFEN 600 MG/1
600 TABLET, FILM COATED ORAL EVERY 8 HOURS PRN
COMMUNITY
End: 2025-03-30 | Stop reason: HOSPADM

## 2025-03-28 RX ORDER — ASCORBIC ACID 500 MG
500 TABLET ORAL DAILY
Status: DISCONTINUED | OUTPATIENT
Start: 2025-03-28 | End: 2025-03-30 | Stop reason: HOSPADM

## 2025-03-28 RX ORDER — BUPRENORPHINE HYDROCHLORIDE AND NALOXONE HYDROCHLORIDE DIHYDRATE 8; 2 MG/1; MG/1
1 TABLET SUBLINGUAL DAILY
Status: DISCONTINUED | OUTPATIENT
Start: 2025-03-28 | End: 2025-03-30 | Stop reason: HOSPADM

## 2025-03-28 RX ORDER — ACETAMINOPHEN 325 MG/1
650 TABLET ORAL EVERY 6 HOURS PRN
Status: DISCONTINUED | OUTPATIENT
Start: 2025-03-28 | End: 2025-03-30 | Stop reason: HOSPADM

## 2025-03-28 RX ORDER — CALCIUM CARBONATE 500(1250)
500 TABLET ORAL DAILY
Status: DISCONTINUED | OUTPATIENT
Start: 2025-03-28 | End: 2025-03-30 | Stop reason: HOSPADM

## 2025-03-28 RX ORDER — BISACODYL 10 MG
10 SUPPOSITORY, RECTAL RECTAL DAILY PRN
Status: DISCONTINUED | OUTPATIENT
Start: 2025-03-28 | End: 2025-03-30 | Stop reason: HOSPADM

## 2025-03-28 RX ORDER — IBUPROFEN 400 MG/1
600 TABLET, FILM COATED ORAL EVERY 8 HOURS PRN
Status: DISCONTINUED | OUTPATIENT
Start: 2025-03-28 | End: 2025-03-30 | Stop reason: HOSPADM

## 2025-03-28 RX ADMIN — HEPARIN SODIUM 5000 UNITS: 5000 INJECTION INTRAVENOUS; SUBCUTANEOUS at 08:38

## 2025-03-28 RX ADMIN — SODIUM CHLORIDE 1000 ML: 9 INJECTION, SOLUTION INTRAVENOUS at 03:51

## 2025-03-28 RX ADMIN — BUPROPION HYDROCHLORIDE 150 MG: 150 TABLET, EXTENDED RELEASE ORAL at 09:48

## 2025-03-28 RX ADMIN — BISACODYL 5 MG: 5 TABLET, COATED ORAL at 18:04

## 2025-03-28 RX ADMIN — Medication 500 MCG: at 09:48

## 2025-03-28 RX ADMIN — VANCOMYCIN HYDROCHLORIDE 1250 MG: 5 INJECTION, POWDER, LYOPHILIZED, FOR SOLUTION INTRAVENOUS at 05:45

## 2025-03-28 RX ADMIN — Medication 100 MG: at 09:47

## 2025-03-28 RX ADMIN — ACETAMINOPHEN 650 MG: 325 TABLET, FILM COATED ORAL at 09:56

## 2025-03-28 RX ADMIN — ACETAMINOPHEN 650 MG: 325 TABLET, FILM COATED ORAL at 18:04

## 2025-03-28 RX ADMIN — BENZONATATE 200 MG: 100 CAPSULE ORAL at 18:03

## 2025-03-28 RX ADMIN — AZTREONAM 2 G: 2 INJECTION, POWDER, LYOPHILIZED, FOR SOLUTION INTRAMUSCULAR; INTRAVENOUS at 03:50

## 2025-03-28 RX ADMIN — CALCIUM 500 MG: 500 TABLET ORAL at 09:47

## 2025-03-28 RX ADMIN — GENTAMICIN SULFATE 300 MG: 40 INJECTION, SOLUTION INTRAMUSCULAR; INTRAVENOUS at 11:58

## 2025-03-28 RX ADMIN — Medication 100 MG: at 09:57

## 2025-03-28 RX ADMIN — LEVOTHYROXINE SODIUM 50 MCG: 0.05 TABLET ORAL at 09:48

## 2025-03-28 RX ADMIN — ONDANSETRON 4 MG: 4 TABLET, ORALLY DISINTEGRATING ORAL at 00:02

## 2025-03-28 RX ADMIN — SODIUM CHLORIDE, POTASSIUM CHLORIDE, SODIUM LACTATE AND CALCIUM CHLORIDE 100 ML/HR: 600; 310; 30; 20 INJECTION, SOLUTION INTRAVENOUS at 21:08

## 2025-03-28 RX ADMIN — OXYCODONE HYDROCHLORIDE AND ACETAMINOPHEN 500 MG: 500 TABLET ORAL at 09:48

## 2025-03-28 RX ADMIN — Medication 200 MG: at 09:47

## 2025-03-28 RX ADMIN — SODIUM CHLORIDE 100 ML/HR: 9 INJECTION, SOLUTION INTRAVENOUS at 07:07

## 2025-03-28 RX ADMIN — POTASSIUM CHLORIDE 40 MEQ: 20 TABLET, EXTENDED RELEASE ORAL at 05:45

## 2025-03-28 RX ADMIN — Medication 1000 UNITS: at 09:47

## 2025-03-28 RX ADMIN — Medication 10 ML: at 22:12

## 2025-03-28 RX ADMIN — SODIUM CHLORIDE, POTASSIUM CHLORIDE, SODIUM LACTATE AND CALCIUM CHLORIDE 100 ML/HR: 600; 310; 30; 20 INJECTION, SOLUTION INTRAVENOUS at 09:51

## 2025-03-28 RX ADMIN — KETOROLAC TROMETHAMINE 60 MG: 60 INJECTION, SOLUTION INTRAMUSCULAR at 00:08

## 2025-03-28 RX ADMIN — CEFTRIAXONE 2000 MG: 2 INJECTION, POWDER, FOR SOLUTION INTRAMUSCULAR; INTRAVENOUS at 10:32

## 2025-03-28 RX ADMIN — PANTOPRAZOLE SODIUM 40 MG: 40 TABLET, DELAYED RELEASE ORAL at 09:47

## 2025-03-28 RX ADMIN — FLUTICASONE PROPIONATE 2 SPRAY: 50 SPRAY, METERED NASAL at 10:32

## 2025-03-28 RX ADMIN — HEPARIN SODIUM 5000 UNITS: 5000 INJECTION INTRAVENOUS; SUBCUTANEOUS at 22:11

## 2025-03-28 RX ADMIN — POTASSIUM CHLORIDE 40 MEQ: 20 TABLET, EXTENDED RELEASE ORAL at 01:19

## 2025-03-28 NOTE — PLAN OF CARE
Goal Outcome Evaluation:   Pt is resting in bed at this time. Pt has ambulated independently in room. Pt had complaint of pain, prn med given. Pt tolerating Regular diet well. Cont. IVF running as ordered. Pt refused to bath this shift, pt educated on the importance of daily hygiene. No acute changes noted at this time. Will continue to follow plan of care.

## 2025-03-28 NOTE — PAYOR COMM NOTE
"CONTACT:  ASHLEIGH PERERA, RN  UTILIZATION MANAGEMENT DEPT.   [facility]    [address] [facility], [address]   PHONE:  [phone]   FAX: [phone]   NPI [id number]      INPATIENT AUTH REQUEST           João Mcclain (35 y.o. Female)       Date of Birth   1990    Social Security Number       Address   [address] [place] [id number]    Home Phone   [phone]    MRN   [id number]       [facility]    Marital Status                               Admission Date   3/27/2025    Admission Type   Emergency    Admitting Provider   Dylan Mayfield MD    Attending Provider   Terrance Kauffman DO    Department, Room/Bed   [address] 1192/1S       Discharge Date       Discharge Disposition       Discharge Destination                                 Attending Provider: Terrance Kauffman DO    Allergies: Cephalexin, Tramadol Hcl, Bactrim [Sulfamethoxazole-trimethoprim]    Isolation: None   Infection: None   Code Status: CPR    Ht: 162.6 cm (64\")   Wt: 69.4 kg (153 lb)    Admission Cmt: None   Principal Problem: Pyelonephritis [N12]                   Active Insurance as of 3/27/2025       Primary Coverage       Payor Plan Insurance Group Employer/Plan Group    [place] [id number]       Payor Plan Address Payor Plan Phone Number Payor Plan Fax Number Effective Dates    [employer] [address] [address] [phone]  2/1/2025 - None Entered    [facility] [id number]         Subscriber Name Subscriber Birth Date Member ID       JOÃO MCCLAIN 1990 [id number]                     Emergency Contacts        (Rel.) Home Phone Work Phone Mobile Phone    Avi Arango (Significant Other) [phone] -- --                 History & Physical        Bin Mcdonald PA-C at 03/28/25 0431       Attestation signed by Dylan Mayfield MD at 03/28/25 0606      I have discussed this " patient with Bin Mcdonald PA-C, and have reviewed this documentation. Would add to assessment/plan that in addition to aztreonam, will treat UTI empirically with IV vancomycin in light of cephalosporin allergy. Follow up urine and blood cultures collected in ED to guide antibiotic choice moving forward.                           [facility]  History & Physical    Patient Identification:  Name:  Marielena Martínez  Age:  35 y.o.  Sex:  female  :  1990  MRN:  [id number]   Visit Number:  [id number]  Admit Date: 3/27/2025   Primary Care Physician:  Charlie Schwab III, MD    Subjective       Chief Complaint   Patient presents with    Abdominal Pain       History of presenting illness:    Marielena Martínez is a 35 y.o. female who presented for further evaluation of generalized abdominal pain x 3 weeks    Past medical history is significant for chronic constipation, migraine headaches, history of substance abuse, recent incarceration-released 3 months ago.    This patient is alert and oriented, in no acute distress.  Patient states that she has been dealing with generalized abdominal pain and suprapubic tenderness for the last 3 weeks.  She reports having been treated for a UTI by her primary care doctor 3 weeks ago, with 2 oral medications that she does not remember the name of.  Patient states she did not have any change in symptoms after this treatment.  Patient reports she has suprapubic tenderness, urinary odor, hematuria, and a urethral tenderness when wiping.  Patient denies burning sensation with urination, urinary frequency, fever, chills, or bodyaches.  The patient has a history of chronic constipation and assumed her abdominal discomfort was due to this issue when the antibiotics did not help her symptoms.  She has performed multiple saline enemas at home with minimal effectiveness and no change in symptoms.  Patient is currently on Suboxone due to history  "of substance abuse.  Patient also reports generalized nausea with a few episodes of vomiting in the last week.  Patient reports right sided back pain over the last few days, especially with sudden movements or deep breaths.    ED, vital signs were /75 (BP Location: Right arm)   Pulse 99   Temp 98.7 °F (37.1 °C) (Oral)   Resp 16   Ht 162.6 cm (64\")   Wt 66.7 kg (147 lb)   LMP 02/18/2025 (Approximate)   SpO2 97%   Breastfeeding No   BMI 25.23 kg/m²   ED workup significant for:   Potassium 2.8, creatinine 1.46, glucose 120, magnesium 1.8, albumin 2.7, WBC 5.27, CRP 22.51, lipase 8, hCG negative,  Urine tox screen positive for amphetamines, methamphetamines, and buprenorphine.  UA showed cloudy appearance, trace blood, positive nitrites, moderate leukocytes, 2+ protein, WBC too numerous to count, 4+ bacteria.  CT abdomen/pelvis shows asymmetric enlargement of the right kidney.  There is also questionable loss of the normal fat within the renal hilum.  This may be developmental in nature.  However, an infectious process such as pyelonephritis could result in a similar appearance.  Recommended close correlation with symptoms and urinalysis results.  No bowel obstruction or perforation.  US abdomen limited shows: Slight enlargement of the right kidney.  This may be developmental.  However, the findings are not entirely specific.  No mass is appreciated.  No hydronephrosis is identified either.  Slightly contracted but otherwise normal-appearing gallbladder.    Known Emergency Department medications received prior to my evaluation included:  Medications   sodium chloride 0.9 % flush 10 mL (has no administration in time range)   potassium chloride (KLOR-CON M20) CR tablet 40 mEq (has no administration in time range)   Magnesium Standard Dose Replacement - Follow Nurse / BPA Driven Protocol (has no administration in time range)   vancomycin 1250 mg/250 mL 0.9% NS IVPB ([facility]) (has no administration in time range) "   vancomycin (VANCOCIN) 750 mg in 0.9% NaCl 250 mL (has no administration in time range)   aztreonam (AZACTAM) 2,000 mg in sodium chloride 0.9 % 100 mL IVPB-VTB (has no administration in time range)   ketorolac (TORADOL) injection 60 mg (60 mg Intramuscular Given 3/28/25 0008)   ondansetron ODT (ZOFRAN-ODT) disintegrating tablet 4 mg (4 mg Oral Given 3/28/25 0002)   potassium chloride (KLOR-CON M20) CR tablet 40 mEq (40 mEq Oral Given 3/28/25 0119)   sodium chloride 0.9 % bolus 1,000 mL (1,000 mL Intravenous New Bag 3/28/25 0351)   aztreonam (AZACTAM) 2 g in sodium chloride 0.9 % 100 mL IVPB-VTB (0 g Intravenous Stopped 3/28/25 0432)       Room location at the time of my evaluation was [place] [address].     ---------------------------------------------------------------------------------------------------------------------   Review of Systems   Constitutional:  Negative for chills, fatigue and fever.   HENT:  Negative for postnasal drip, rhinorrhea, sinus pressure, sneezing and sore throat.    Eyes:  Negative for discharge and redness.   Respiratory:  Negative for cough, shortness of breath and wheezing.    Cardiovascular:  Negative for chest pain, palpitations and leg swelling.   Gastrointestinal:  Positive for abdominal pain, constipation and nausea. Negative for diarrhea and vomiting.   Genitourinary:  Positive for dysuria and hematuria. Negative for difficulty urinating and frequency.   Musculoskeletal:  Negative for arthralgias and joint swelling.   Skin:  Negative for rash and wound.   Neurological:  Negative for dizziness, speech difficulty, light-headedness and headaches.   Hematological:  Does not bruise/bleed easily.   Psychiatric/Behavioral:  Negative for confusion. The patient is not nervous/anxious.      ---------------------------------------------------------------------------------------------------------------------   Past Medical History:   Diagnosis Date    Anxiety     Arthritis     Injury of back      Migraine      No past surgical history on file.  Family History   Problem Relation Age of Onset    Anxiety disorder Mother     Arthritis Mother     Heart disease Paternal Grandfather     Stroke Paternal Grandfather     Lung disease Paternal Grandmother     Breast cancer Maternal Grandmother     Anxiety disorder Maternal Grandmother     Arthritis Maternal Grandmother     Hypertension Maternal Grandmother      Social History     Socioeconomic History    Marital status:    Tobacco Use    Smoking status: Every Day     Current packs/day: 0.50     Types: Cigarettes    Smokeless tobacco: Never   Substance and Sexual Activity    Alcohol use: No    Drug use: Yes     Types: IV     Comment: oxycodone 30mg iv daily but trying to quit; last intake approx 2wks ago; snorted last 3 days ago    Sexual activity: Never     ---------------------------------------------------------------------------------------------------------------------   Allergies:  Cephalexin, Tramadol hcl, and Bactrim [sulfamethoxazole-trimethoprim]  ---------------------------------------------------------------------------------------------------------------------   Home medications:  Medications below are reported home medications pulling from within the system; at this time, these medications have not been reconciled unless otherwise specified and are in the verification process for further verifcation as current home medications.  (Not in a hospital admission)      Hospital Scheduled Meds:  aztreonam, 2,000 mg, Intravenous, Q8H  potassium chloride, 40 mEq, Oral, Once  vancomycin, 750 mg, Intravenous, Q12H  vancomycin, 20 mg/kg, Intravenous, Once             Current listed hospital scheduled medications may not yet reflect those currently placed in orders that are signed and held awaiting patient's arrival to floor.   ---------------------------------------------------------------------------------------------------------------------     Objective      Vital Signs:  Temp:  [98.7 °F (37.1 °C)] 98.7 °F (37.1 °C)  Heart Rate:  [] 99  Resp:  [16-17] 16  BP: (100-109)/(66-77) 109/75      03/27/25  2221   Weight: 66.7 kg (147 lb)     Body mass index is 25.23 kg/m².  ---------------------------------------------------------------------------------------------------------------------     Physical Exam  Vitals reviewed.   Constitutional:       General: She is not in acute distress.     Appearance: Normal appearance.   HENT:      Head: Normocephalic and atraumatic.      Nose: Nose normal.      Mouth/Throat:      Mouth: Mucous membranes are moist.   Eyes:      Conjunctiva/sclera: Conjunctivae normal.      Pupils: Pupils are equal, round, and reactive to light.   Cardiovascular:      Rate and Rhythm: Normal rate and regular rhythm.      Pulses: Normal pulses.      Heart sounds: Normal heart sounds. No murmur heard.  Pulmonary:      Effort: Pulmonary effort is normal. No respiratory distress.      Breath sounds: Normal breath sounds. No wheezing or rales.   Abdominal:      General: Bowel sounds are normal. There is no distension.      Palpations: Abdomen is soft.      Tenderness: There is abdominal tenderness in the epigastric area, suprapubic area and left upper quadrant. There is right CVA tenderness. There is no left CVA tenderness.   Musculoskeletal:         General: No deformity.      Right lower leg: No edema.      Left lower leg: No edema.   Skin:     General: Skin is warm and dry.      Findings: No erythema, lesion or rash.   Neurological:      Mental Status: She is alert and oriented to person, place, and time. Mental status is at baseline.   Psychiatric:         Mood and Affect: Mood normal.         Behavior: Behavior normal.       ---------------------------------------------------------------------------------------------------------------------  KAREN:    Ordered - Pending    I have personally looked at the  "EKG.  ---------------------------------------------------------------------------------------------------------------------   Results from last 7 days   Lab Units 03/28/25  0459 03/28/25  0027   CRP mg/dL  --  22.51*   LACTATE mmol/L 0.8  --    WBC 10*3/mm3  --  5.27   HEMOGLOBIN g/dL  --  15.4   HEMATOCRIT %  --  46.9*   MCV fL  --  82.4   MCHC g/dL  --  32.8   PLATELETS 10*3/mm3  --  127*         Results from last 7 days   Lab Units 03/28/25  0027   SODIUM mmol/L 139   POTASSIUM mmol/L 2.8*   MAGNESIUM mg/dL 1.8   CHLORIDE mmol/L 105   CO2 mmol/L 22.1   BUN mg/dL 20   CREATININE mg/dL 1.46*   CALCIUM mg/dL 8.6   GLUCOSE mg/dL 120*   ALBUMIN g/dL 2.7*   BILIRUBIN mg/dL 0.3   ALK PHOS U/L 166*   AST (SGOT) U/L 19   ALT (SGPT) U/L 18   Estimated Creatinine Clearance: 50 mL/min (A) (by C-G formula based on SCr of 1.46 mg/dL (H)).  No results found for: \"AMMONIA\"          No results found for: \"HGBA1C\"  Lab Results   Component Value Date    TSH 1.592 08/25/2015     Lab Results   Component Value Date    PREGTESTUR Positive (A) 11/18/2015    HCGQUANT 62,758 (H) 11/30/2015     Pain Management Panel  More data exists         Latest Ref Rng & Units 3/28/2025 11/12/2017   Pain Management Panel   Amphetamine, Urine Qual Negative Positive  Negative    Barbiturates Screen, Urine Negative Negative  Positive    Benzodiazepine Screen, Urine Negative Negative  Negative    Buprenorphine, Screen, Urine Negative Positive  Negative    Cocaine Screen, Urine Negative Negative  Negative    Fentanyl, Urine Negative Negative  -   Methadone Screen , Urine Negative Negative  Negative    Methamphetamine, Ur Negative Positive  -     No results found for: \"BLOODCX\"  No results found for: \"URINECX\"  No results found for: \"WOUNDCX\"  No results found for: \"STOOLCX\"      ---------------------------------------------------------------------------------------------------------------------  Imaging Results (Last 7 Days)       Procedure Component Value " Units Date/Time    US Abdomen Limited [[id number]] Collected: 03/28/25 0138     Updated: 03/28/25 0144    Narrative:      EXAMINATION: Ultrasound abdomen limited     CLINICAL HISTORY: Abdominal pain     COMPARISON: None     FINDINGS:  The pancreas is not well seen. The gallbladder is contracted, thus  limiting its evaluation. This also accentuates wall thickness. No  gallstones are appreciated. No gallbladder wall thickening. There is no  intra or extrahepatic ductal dilatation. The common duct measures 2 mm.  The right kidney is enlarged measuring 15.9 cm in length and 5.6 x 6.3  cm. The findings are nonspecific. This may be developmental. No discrete  mass is appreciated.       Impression:      1. Slight enlargement of the right kidney. This may be developmental.  However, the findings are not entirely specific. No mass is appreciated.  No hydronephrosis is identified either.  2. Slightly contracted but otherwise normal-appearing gallbladder.     This report was finalized on 3/28/2025 1:42 AM by Laureano Armstrong MD.       CT Abdomen Pelvis Without Contrast [[id number]] Collected: 03/28/25 0133     Updated: 03/28/25 0140    Narrative:      EXAMINATION: CT abdomen and pelvis without contrast     HISTORY: Abdominal pain     COMPARISON: None available.     TECHNIQUE: Contiguous 4 mm thick slices were obtained through the  abdomen and pelvis without contrast. Coronal and sagittal reformats were  performed.     FINDINGS:  ABDOMEN:  No focal airspace disease is appreciated within the visualized lung  bases. The lack of intravenous contrast material limits evaluation of  the solid abdominal viscera. However, no contour deforming lesion is  appreciated within the unenhanced liver, spleen or adrenal glands. The  unenhanced pancreas appears unremarkable. The right kidney is enlarged  relative to the left and there is slight loss of the normal fat within  the renal hilum. This may be developmental. However, the findings  "are  nonspecific. In the acute setting this may be related to pyelonephritis.  No definite ureteral calculus is appreciated. No adenopathy, free or  loculated collection is appreciated within the upper abdomen.     PELVIS:  Prominent formed colonic stool is noted suggesting constipation. The  appendix is well-visualized and is normal. There is no acute  appendicitis, colitis or diverticulitis. No free air. No significant  free or loculated collection is appreciated.     Evaluation of the bones demonstrates no acute or suspicious osseous  lesion.       Impression:      1. Asymmetric enlargement of the right kidney as above. There is also  questionable loss of the normal fat within the renal hilum. This may be  developmental in nature. However, an infectious process such as  pyelonephritis could result in a similar appearance. Recommend close  correlation with symptoms and urinalysis results.  2. No bowel obstruction or perforation.     This report was finalized on 3/28/2025 1:38 AM by Laureano Armstrong MD.               Cultures:  No results found for: \"BLOODCX\", \"URINECX\", \"WOUNDCX\", \"MRSACX\", \"RESPCX\", \"STOOLCX\"    Last echocardiogram:      I have personally reviewed the above radiology images and read the final radiology report on 03/28/25  ---------------------------------------------------------------------------------------------------------------------  Assessment / Plan     Active Hospital Problems    Diagnosis  POA    **Pyelonephritis [N12]  Yes       ASSESSMENT/PLAN:  Acute pyelonephritis, POA  Patient presents with generalized abdominal pain, suprapubic tenderness, urinary odor, hematuria, right sided back pain with right CVA tenderness.  UA showed cloudy appearance, trace blood, positive nitrites, moderate leukocytes, 2+ protein, WBC too numerous to count, 4+ bacteria.  CT abdomen/pelvis shows asymmetric enlargement of the right kidney.  There is also questionable loss of the normal fat within the renal " hilum.  This may be developmental in nature.  However, an infectious process such as pyelonephritis could result in a similar appearance.  Recommended close correlation with symptoms and urinalysis results.  No bowel obstruction or perforation.  US abdomen limited shows: Slight enlargement of the right kidney.  This may be developmental.  However, the findings are not entirely specific.  No mass is appreciated.  No hydronephrosis is identified either.  Slightly contracted but otherwise normal-appearing gallbladder.  Continue aztreonam due to antibiotic allergies, follow up cultures.  Patient does not meet sepsis criteria at this time. Continue to monitor vital signs.  Acute constipation, POA  CT abdomen/pelvis notes: Prominent formed colonic stool is noted suggesting constipation. The appendix is well-visualized and is normal. There is no acute appendicitis, colitis or diverticulitis. No free air. No significant free or loculated collection is appreciated.   Patient expressed concern for tapeworms due to recent history of incarceration.  Stool ova and parasite lab ordered  Bowel regimen including Doc-senna and MiraLAX.   Hypokalemia, secondary to GI losses/poor oral intake  Potassium replacement protocol started, continue to replace per protocol.  Monitor EKG       Chronic Conditions  Anxiety  Arthritis  GERD  Migraine headaches  History of substance abuse  Review and continue appropriate home medications per med rec once completed by pharmacy      ----------  -DVT prophylaxis: Subcu heparin  -Activity: Up with assistance  -Expected length of stay: INPATIENT status due to the need for care which can only be reasonably provided in an hospital setting such as aggressive/expedited ancillary services and/or consultation services, the necessity for IV medications, close physician monitoring and/or the possible need for procedures.  In such, I feel patient’s risk for adverse outcomes and need for care warrant INPATIENT  evaluation and predict the patient’s care encounter to likely last beyond 2 midnights.    -Disposition: Pending clinical course     High risk secondary to acute pyelonephritis    Code Status and Medical Interventions: CPR (Attempt to Resuscitate); Full Support   Ordered at: 03/28/25 0430     Code Status (Patient has no pulse and is not breathing):    CPR (Attempt to Resuscitate)     Medical Interventions (Patient has pulse or is breathing):    Full Support       Bin Mcdonald PA-C   03/28/25  05:21 EDT      Electronically signed by Dylan Mayfield MD at 03/28/25 0606          Emergency Department Notes        Delfino Durand, RN at 03/28/25 0558          Called receiving nurse to inform patient was on the way to floor.    Electronically signed by Delfino Durand RN at 03/28/25 0559       Savanah Morocho at 03/28/25 0357          Called lab to stick patient     Electronically signed by Savanah Morocho at 03/28/25 0357       Andrew Arredondo PCT at 03/27/25 2240          Patient was given urine collection supplies and instructed to provide a urine sample. Pt acknowledged but is unable to provide at this time.     Electronically signed by Andrew Arredondo PCT at 03/27/25 7643       Lisa Jones APRN at 03/27/25 2233          Subjective   History of Present Illness  Patient is a 35-year-old female who complains of generalized abdominal pain that started yesterday.  States that she has been constipated not had a bowel movement for a week.  She has had some nausea without vomiting.  She states that earlier today she gave himself a saline enema and thinks that she may have passed some worms.  She took photographs of her stool with her cell phone and showed them to me, based upon this it is possible that the patient could have tapeworms.  Not definitive.  She denies fever, chills, chest pain, shortness of breath, hematemesis, hematochezia or melena, syncope or near syncope, focal numbness or weakness, other  symptoms or other complaints.        Review of Systems   All other systems reviewed and are negative.      Past Medical History:   Diagnosis Date    Anxiety     Arthritis     Injury of back     Migraine        Allergies   Allergen Reactions    Cephalexin     Tramadol Hcl     Bactrim [Sulfamethoxazole-Trimethoprim] Rash       No past surgical history on file.    Family History   Problem Relation Age of Onset    Anxiety disorder Mother     Arthritis Mother     Heart disease Paternal Grandfather     Stroke Paternal Grandfather     Lung disease Paternal Grandmother     Breast cancer Maternal Grandmother     Anxiety disorder Maternal Grandmother     Arthritis Maternal Grandmother     Hypertension Maternal Grandmother        Social History     Socioeconomic History    Marital status:    Tobacco Use    Smoking status: Every Day     Current packs/day: 0.50     Types: Cigarettes    Smokeless tobacco: Never   Substance and Sexual Activity    Alcohol use: No    Drug use: Yes     Types: IV     Comment: oxycodone 30mg iv daily but trying to quit; last intake approx 2wks ago; snorted last 3 days ago    Sexual activity: Never           Objective   Physical Exam  Vitals and nursing note reviewed.   Constitutional:       General: She is not in acute distress.     Appearance: Normal appearance. She is well-developed. She is not ill-appearing, toxic-appearing or diaphoretic.      Comments: Well-developed well-nourished female, alert and well-oriented, in no apparent acute distress.  She does not appear acutely ill.   HENT:      Head: Normocephalic and atraumatic.   Eyes:      General: No scleral icterus.     Extraocular Movements: Extraocular movements intact.      Pupils: Pupils are equal, round, and reactive to light.   Neck:      Trachea: No tracheal deviation.   Cardiovascular:      Rate and Rhythm: Normal rate and regular rhythm.   Pulmonary:      Effort: Pulmonary effort is normal. No respiratory distress.      Breath  sounds: Normal breath sounds.   Chest:      Chest wall: No tenderness.   Abdominal:      General: Bowel sounds are normal.      Palpations: Abdomen is soft.      Tenderness: There is abdominal tenderness (Minimal generalized tenderness, slightly more prominent in the right upper quadrant and epigastrium, no other focal findings.  No acute peritoneal signs.). There is no right CVA tenderness, left CVA tenderness, guarding or rebound.   Musculoskeletal:         General: No tenderness. Normal range of motion.      Cervical back: Normal range of motion and neck supple. No rigidity or tenderness.      Right lower leg: No edema.      Left lower leg: No edema.   Skin:     General: Skin is warm and dry.      Capillary Refill: Capillary refill takes less than 2 seconds.      Coloration: Skin is not pale.   Neurological:      General: No focal deficit present.      Mental Status: She is alert and oriented to person, place, and time.      GCS: GCS eye subscore is 4. GCS verbal subscore is 5. GCS motor subscore is 6.      Motor: No abnormal muscle tone.   Psychiatric:         Mood and Affect: Mood normal.         Behavior: Behavior normal.       Results for orders placed or performed during the hospital encounter of 03/27/25   Comprehensive Metabolic Panel    Collection Time: 03/28/25 12:27 AM    Specimen: Arm, Right; Blood   Result Value Ref Range    Glucose 120 (H) 65 - 99 mg/dL    BUN 20 6 - 20 mg/dL    Creatinine 1.46 (H) 0.57 - 1.00 mg/dL    Sodium 139 136 - 145 mmol/L    Potassium 2.8 (L) 3.5 - 5.2 mmol/L    Chloride 105 98 - 107 mmol/L    CO2 22.1 22.0 - 29.0 mmol/L    Calcium 8.6 8.6 - 10.5 mg/dL    Total Protein 6.2 6.0 - 8.5 g/dL    Albumin 2.7 (L) 3.5 - 5.2 g/dL    ALT (SGPT) 18 1 - 33 U/L    AST (SGOT) 19 1 - 32 U/L    Alkaline Phosphatase 166 (H) 39 - 117 U/L    Total Bilirubin 0.3 0.0 - 1.2 mg/dL    Globulin 3.5 gm/dL    A/G Ratio 0.8 g/dL    BUN/Creatinine Ratio 13.7 7.0 - 25.0    Anion Gap 11.9 5.0 - 15.0 mmol/L     eGFR 47.6 (L) >60.0 mL/min/1.73   Lipase    Collection Time: 03/28/25 12:27 AM    Specimen: Arm, Right; Blood   Result Value Ref Range    Lipase 8 (L) 13 - 60 U/L   hCG, Serum, Qualitative    Collection Time: 03/28/25 12:27 AM    Specimen: Arm, Right; Blood   Result Value Ref Range    HCG Qualitative Negative Negative   CBC Auto Differential    Collection Time: 03/28/25 12:27 AM    Specimen: Arm, Right; Blood   Result Value Ref Range    WBC 5.27 3.40 - 10.80 10*3/mm3    RBC 5.69 (H) 3.77 - 5.28 10*6/mm3    Hemoglobin 15.4 12.0 - 15.9 g/dL    Hematocrit 46.9 (H) 34.0 - 46.6 %    MCV 82.4 79.0 - 97.0 fL    MCH 27.1 26.6 - 33.0 pg    MCHC 32.8 31.5 - 35.7 g/dL    RDW 14.2 12.3 - 15.4 %    RDW-SD 42.5 37.0 - 54.0 fl    MPV 10.4 6.0 - 12.0 fL    Platelets 127 (L) 140 - 450 10*3/mm3    Neutrophil % 82.6 (H) 42.7 - 76.0 %    Lymphocyte % 5.9 (L) 19.6 - 45.3 %    Monocyte % 9.5 5.0 - 12.0 %    Eosinophil % 0.8 0.3 - 6.2 %    Basophil % 0.8 0.0 - 1.5 %    Immature Grans % 0.4 0.0 - 0.5 %    Neutrophils, Absolute 4.36 1.70 - 7.00 10*3/mm3    Lymphocytes, Absolute 0.31 (L) 0.70 - 3.10 10*3/mm3    Monocytes, Absolute 0.50 0.10 - 0.90 10*3/mm3    Eosinophils, Absolute 0.04 0.00 - 0.40 10*3/mm3    Basophils, Absolute 0.04 0.00 - 0.20 10*3/mm3    Immature Grans, Absolute 0.02 0.00 - 0.05 10*3/mm3    nRBC 0.0 0.0 - 0.2 /100 WBC   Scan Slide    Collection Time: 03/28/25 12:27 AM    Specimen: Arm, Right; Blood   Result Value Ref Range    RBC Morphology Normal Normal    Dohle Bodies Present None Seen    Platelet Morphology Normal Normal   Magnesium    Collection Time: 03/28/25 12:27 AM    Specimen: Arm, Right; Blood   Result Value Ref Range    Magnesium 1.8 1.6 - 2.6 mg/dL   C-reactive Protein    Collection Time: 03/28/25 12:27 AM    Specimen: Arm, Right; Blood   Result Value Ref Range    C-Reactive Protein 22.51 (H) 0.00 - 0.50 mg/dL   Green Top (Gel)    Collection Time: 03/28/25 12:27 AM   Result Value Ref Range    Extra Tube  Hold for add-ons.    Lavender Top    Collection Time: 03/28/25 12:27 AM   Result Value Ref Range    Extra Tube hold for add-on    Light Blue Top    Collection Time: 03/28/25 12:27 AM   Result Value Ref Range    Extra Tube Hold for add-ons.    Urinalysis With Culture If Indicated - Urine, Clean Catch    Collection Time: 03/28/25  1:21 AM    Specimen: Urine, Clean Catch   Result Value Ref Range    Color, UA Yellow Yellow, Straw    Appearance, UA Cloudy (A) Clear    pH, UA 6.0 5.0 - 8.0    Specific Gravity, UA 1.017 1.005 - 1.030    Glucose, UA Negative Negative    Ketones, UA Negative Negative    Bilirubin, UA Negative Negative    Blood, UA Trace (A) Negative    Protein,  mg/dL (2+) (A) Negative    Leuk Esterase, UA Moderate (2+) (A) Negative    Nitrite, UA Positive (A) Negative    Urobilinogen, UA 1.0 E.U./dL 0.2 - 1.0 E.U./dL   Urinalysis, Microscopic Only - Urine, Clean Catch    Collection Time: 03/28/25  1:21 AM    Specimen: Urine, Clean Catch   Result Value Ref Range    RBC, UA 0-2 None Seen, 0-2 /HPF    WBC, UA Too Numerous to Count (A) None Seen, 0-2 /HPF    Bacteria, UA 4+ (A) None Seen /HPF    Squamous Epithelial Cells, UA 0-2 None Seen, 0-2 /HPF    Hyaline Casts, UA 7-12 None Seen /LPF    Methodology Manual Light Microscopy    Urine Drug Screen - Urine, Clean Catch    Collection Time: 03/28/25  1:21 AM    Specimen: Urine, Clean Catch   Result Value Ref Range    THC, Screen, Urine Negative Negative    Phencyclidine (PCP), Urine Negative Negative    Cocaine Screen, Urine Negative Negative    Methamphetamine, Ur Positive (A) Negative    Opiate Screen Negative Negative    Amphetamine Screen, Urine Positive (A) Negative    Benzodiazepine Screen, Urine Negative Negative    Tricyclic Antidepressants Screen Negative Negative    Methadone Screen, Urine Negative Negative    Barbiturates Screen, Urine Negative Negative    Oxycodone Screen, Urine Negative Negative    Buprenorphine, Screen, Urine Positive (A)  Negative   Fentanyl, Urine - Urine, Clean Catch    Collection Time: 03/28/25  1:21 AM    Specimen: Urine, Clean Catch   Result Value Ref Range    Fentanyl, Urine Negative Negative   Lactic Acid, Plasma    Collection Time: 03/28/25  4:59 AM    Specimen: Arm, Right; Blood   Result Value Ref Range    Lactate 0.8 0.5 - 2.0 mmol/L     US Abdomen Limited  Result Date: 3/28/2025  1. Slight enlargement of the right kidney. This may be developmental. However, the findings are not entirely specific. No mass is appreciated. No hydronephrosis is identified either. 2. Slightly contracted but otherwise normal-appearing gallbladder.  This report was finalized on 3/28/2025 1:42 AM by Laureano Armstrong MD.      CT Abdomen Pelvis Without Contrast  Result Date: 3/28/2025  1. Asymmetric enlargement of the right kidney as above. There is also questionable loss of the normal fat within the renal hilum. This may be developmental in nature. However, an infectious process such as pyelonephritis could result in a similar appearance. Recommend close correlation with symptoms and urinalysis results. 2. No bowel obstruction or perforation.  This report was finalized on 3/28/2025 1:38 AM by Laureano Armstrong MD.        Procedures          ED Course  ED Course as of 03/28/25 0521   Fri Mar 28, 2025   0019 We are unable to establish peripheral IV access.  Difficulty with phlebotomy as well.  Patient reports a long history of IV drug use.  I have changed her medications to IM/p.o.  We have requested respiratory to draw her blood arterially. [CM]   0139 Case discussed with and care endorsed to HEVER Hilliard, pending radiological studies. [CM]   0352 Paged hospitalist for admission [KH]   6199 Spoke with Dr. Mayfield who accepts the patient to the hospitalist team. [KH]      ED Course User Index  [CM] Ke Kearney MD  [KH] Lisa Jones APRN                                                       Medical Decision Making  Problems  Addressed:  Acute pyelonephritis: complicated acute illness or injury    Amount and/or Complexity of Data Reviewed  Labs: ordered.  Radiology: ordered.    Risk  Prescription drug management.  Decision regarding hospitalization.        Final diagnoses:   Acute pyelonephritis       ED Disposition  ED Disposition       ED Disposition   Decision to Admit    Condition   --    Comment   Level of Care: Med/Surg [1]   Diagnosis: Pyelonephritis [679168]   Admitting Physician: DYLAN MAYFIELD [[id number]]   Attending Physician: DYLAN MAYFIELD [[id number]]   Certification: I Certify That Inpatient Hospital Services Are Medically Necessary For Greater Than 2 Midnights                 No follow-up provider specified.       Medication List        New Prescriptions      praziquantel 600 MG tablet  Commonly known as: BILTRICIDE  Take 1 tablet by mouth 1 time for 1 dose.               Where to Get Your Medications        These medications were sent to [facility] [address] [phone] [facility] [phone] [address] [address]      Phone: [phone]   praziquantel 600 MG tablet            Lisa Jones APRN  03/28/25 0521      Electronically signed by Lisa Jones APRN at 03/28/25 0521       Facility-Administered Medications as of 3/28/2025   Medication Dose Route Frequency Provider Last Rate Last Admin    acetaminophen (TYLENOL) tablet 650 mg  650 mg Oral Q6H PRN Bin Mcdonald PA-C        ascorbic acid (VITAMIN C) tablet 500 mg  500 mg Oral Daily Terrance Kauffman DO        [COMPLETED] aztreonam (AZACTAM) 2 g in sodium chloride 0.9 % 100 mL IVPB-VTB  2 g Intravenous Once Lisa Jones APRN   Stopped at 03/28/25 0432    aztreonam (AZACTAM) 2,000 mg in sodium chloride 0.9 % 100 mL IVPB-VTB  2,000 mg Intravenous Q8H Dylan Mayfield MD        benzonatate (TESSALON) capsule 200 mg  200 mg Oral TID PRN Terrance Kauffman DO         sennosides-docusate (PERICOLACE) 8.6-50 MG per tablet 2 tablet  2 tablet Oral BID PRN Dylan Mayfield MD        And    polyethylene glycol (MIRALAX) packet 17 g  17 g Oral Daily PRN Dylan Mayfield MD        And    bisacodyl (DULCOLAX) EC tablet 5 mg  5 mg Oral Daily PRN Dylan Mayfield MD        And    bisacodyl (DULCOLAX) suppository 10 mg  10 mg Rectal Daily PRN Dylan Mayfield MD        buprenorphine-naloxone (SUBOXONE) 8-2 MG per SL tablet 1 tablet  1 tablet Sublingual Daily Dylan Mayfield MD        buPROPion XL (WELLBUTRIN XL) 24 hr tablet 150 mg  150 mg Oral Daily Terrance Kauffman DO        calcium carbonate (oyster shell) tablet 500 mg  500 mg Oral Daily Terrance Kauffman DO        cholecalciferol (VITAMIN D3) tablet 1,000 Units  1,000 Units Oral Daily Terrance Kauffman DO        fluticasone (FLONASE) 50 MCG/ACT nasal spray 2 spray  2 spray Nasal Daily Terrance Kauffman DO        heparin (porcine) 5000 UNIT/ML injection 5,000 Units  5,000 Units Subcutaneous Q12H Dylan Mayfield MD   5,000 Units at 03/28/25 0838    ibuprofen (ADVIL,MOTRIN) tablet 600 mg  600 mg Oral Q8H PRN Terrance Kauffman DO        [COMPLETED] ketorolac (TORADOL) injection 60 mg  60 mg Intramuscular Once Ke Kearney MD   60 mg at 03/28/25 0008    lactated ringers infusion  100 mL/hr Intravenous Continuous Terrance Kauffman DO        levothyroxine (SYNTHROID, LEVOTHROID) tablet 50 mcg  50 mcg Oral QAM AC Terrance Kauffman DO        magnesium oxide (MAG-OX) tablet 200 mg  200 mg Oral Daily Terrance Kauffman DO        Magnesium Standard Dose Replacement - Follow Nurse / BPA Driven Protocol   Not Applicable PRN Dylan Mayfield MD        nicotine (NICODERM CQ) 21 MG/24HR patch 1 patch  1 patch Transdermal Daily PRN Terrance Kauffman DO        [COMPLETED] ondansetron ODT (ZOFRAN-ODT) disintegrating tablet 4 mg  4 mg Oral Once Christel,  Ke Cotres MD   4 mg at 03/28/25 0002    ondansetron ODT (ZOFRAN-ODT) disintegrating tablet 4 mg  4 mg Translingual Q8H PRN Terrance Kauffman DO        pantoprazole (PROTONIX) EC tablet 40 mg  40 mg Oral QAM AC Terrance Kauffman DO        [COMPLETED] potassium chloride (KLOR-CON M20) CR tablet 40 mEq  40 mEq Oral Once Ke Kearney MD   40 mEq at 03/28/25 0119    [COMPLETED] potassium chloride (KLOR-CON M20) CR tablet 40 mEq  40 mEq Oral Once Lisa Jones APRN   40 mEq at 03/28/25 0545    [COMPLETED] sodium chloride 0.9 % bolus 1,000 mL  1,000 mL Intravenous Once Lisa Jones APRN   Stopped at 03/28/25 0500    sodium chloride 0.9 % flush 10 mL  10 mL Intravenous PRN Dylan Mayfield MD        sodium chloride 0.9 % flush 10 mL  10 mL Intravenous Q12H Dylan Mayfield MD        sodium chloride 0.9 % flush 10 mL  10 mL Intravenous PRN Dylan Mayfield MD        sodium chloride 0.9 % infusion 40 mL  40 mL Intravenous PRN Dylan Mayfield MD        thiamine (VITAMIN B-1) tablet 100 mg  100 mg Oral Daily Terrance Kauffman DO        vancomycin (VANCOCIN) 750 mg in 0.9% NaCl 250 mL  750 mg Intravenous Q12H Dylan Mayfield MD        [COMPLETED] vancomycin 1250 mg/250 mL 0.9% NS IVPB ([facility])  20 mg/kg Intravenous Once Dylan Mayfield MD   1,250 mg at 03/28/25 0545    vitamin B-12 (CYANOCOBALAMIN) tablet 500 mcg  500 mcg Oral Daily Terrance Kauffman DO        vitamin B-6 (PYRIDOXINE) tablet 100 mg  100 mg Oral Daily Terrance Kauffman DO         Orders (all)        Start     Ordered    03/29/25 0600  Comprehensive Metabolic Panel  Morning Draw         03/28/25 0840    03/29/25 0600  CBC & Differential  Morning Draw         03/28/25 0840    03/29/25 0600  Magnesium  Morning Draw         03/28/25 0840    03/28/25 1700  vancomycin (VANCOCIN) 750 mg in 0.9% NaCl 250 mL  Every 12 Hours         03/28/25 0432    03/28/25 1200  Strict Intake &  Output  Every 6 Hours         03/28/25 0605    03/28/25 1200  aztreonam (AZACTAM) 2,000 mg in sodium chloride 0.9 % 100 mL IVPB-VTB  Every 8 Hours         03/28/25 0438    03/28/25 0945  ascorbic acid (VITAMIN C) tablet 500 mg  Daily         03/28/25 0855    03/28/25 0945  buPROPion XL (WELLBUTRIN XL) 24 hr tablet 150 mg  Daily         03/28/25 0855    03/28/25 0945  calcium carbonate (oyster shell) tablet 500 mg  Daily         03/28/25 0855    03/28/25 0945  cholecalciferol (VITAMIN D3) tablet 1,000 Units  Daily         03/28/25 0855    03/28/25 0945  pantoprazole (PROTONIX) EC tablet 40 mg  Every Morning Before Breakfast         03/28/25 0855    03/28/25 0945  fluticasone (FLONASE) 50 MCG/ACT nasal spray 2 spray  Daily         03/28/25 0855    03/28/25 0945  levothyroxine (SYNTHROID, LEVOTHROID) tablet 50 mcg  Every Morning Before Breakfast         03/28/25 0855    03/28/25 0945  magnesium oxide (MAG-OX) tablet 200 mg  Daily         03/28/25 0855    03/28/25 0945  vitamin B-6 (PYRIDOXINE) tablet 100 mg  Daily         03/28/25 0855    03/28/25 0945  thiamine (VITAMIN B-1) tablet 100 mg  Daily         03/28/25 0855    03/28/25 0945  vitamin B-12 (CYANOCOBALAMIN) tablet 500 mcg  Daily         03/28/25 0855    03/28/25 0930  lactated ringers infusion  Continuous         03/28/25 0840    03/28/25 0900  sodium chloride 0.9 % flush 10 mL  Every 12 Hours Scheduled         03/28/25 0605    03/28/25 0900  heparin (porcine) 5000 UNIT/ML injection 5,000 Units  Every 12 Hours Scheduled         03/28/25 0605    03/28/25 0854  ibuprofen (ADVIL,MOTRIN) tablet 600 mg  Every 8 Hours PRN         03/28/25 0855    03/28/25 0854  nicotine (NICODERM CQ) 21 MG/24HR patch 1 patch  Daily PRN         03/28/25 0855    03/28/25 0854  ondansetron ODT (ZOFRAN-ODT) disintegrating tablet 4 mg  Every 8 Hours PRN         03/28/25 0855    03/28/25 0854  benzonatate (TESSALON) capsule 200 mg  3 Times Daily PRN         03/28/25 0855    03/28/25 0854   "Diet: Regular/House; Fluid Consistency: Thin (IDDSI 0)  Diet Effective Now         03/28/25 0853    03/28/25 0841  Inpatient Infectious Diseases Consult  Once        Specialty:  Infectious Diseases  Provider:  (Not yet assigned)    03/28/25 0840    03/28/25 0800  Vital Signs  Every 8 Hours        Comments: Per per hospital policy    03/28/25 0605    03/28/25 0800  Oral Care  2 Times Daily       03/28/25 0605    03/28/25 0754  Scan Slide  Once         03/28/25 0753    03/28/25 0700  sodium chloride 0.9 % infusion  Continuous,   Status:  Discontinued         03/28/25 0605    03/28/25 0700  buprenorphine-naloxone (SUBOXONE) 8-2 MG per SL tablet 1 tablet  Daily        Note to Pharmacy: Hold for oversedation, SBP<100, RR<12    03/28/25 0614    03/28/25 0606  Notify Physician (with Parameters)  Until Discontinued         03/28/25 0605    03/28/25 0606  Insert Peripheral IV  Once         03/28/25 0605    03/28/25 0606  Saline Lock & Maintain IV Access  Continuous         03/28/25 0605    03/28/25 0606  Daily Weights  Daily       03/28/25 0605    03/28/25 0606  Diet: Liquid; Clear Liquid; Fluid Consistency: Thin (IDDSI 0)  Diet Effective Now,   Status:  Canceled         03/28/25 0605    03/28/25 0606  CBC Auto Differential  Morning Draw         03/28/25 0605    03/28/25 0606  Comprehensive Metabolic Panel  Morning Draw         03/28/25 0605    03/28/25 0606  C-reactive Protein  Morning Draw         03/28/25 0605    03/28/25 0605  sodium chloride 0.9 % flush 10 mL  As Needed         03/28/25 0605    03/28/25 0605  sodium chloride 0.9 % infusion 40 mL  As Needed         03/28/25 0605    03/28/25 0605  sennosides-docusate (PERICOLACE) 8.6-50 MG per tablet 2 tablet  2 Times Daily PRN        Placed in \"And\" Linked Group    03/28/25 0605    03/28/25 0605  polyethylene glycol (MIRALAX) packet 17 g  Daily PRN        Placed in \"And\" Linked Group    03/28/25 0605    03/28/25 0605  bisacodyl (DULCOLAX) EC tablet 5 mg  Daily PRN      " "  Placed in \"And\" Linked Group    03/28/25 0605    03/28/25 0605  bisacodyl (DULCOLAX) suppository 10 mg  Daily PRN        Placed in \"And\" Linked Group    03/28/25 0605    03/28/25 0551  acetaminophen (TYLENOL) tablet 650 mg  Every 6 Hours PRN         03/28/25 0551    03/28/25 0546  Ova & Parasite Examination - Stool, Per Rectum  Once         03/28/25 0546    03/28/25 0447  vancomycin 1250 mg/250 mL 0.9% NS IVPB ([facility])  Once         03/28/25 0431    03/28/25 0431  ECG 12 Lead QT Measurement  STAT         03/28/25 0430    03/28/25 0430  Procalcitonin  STAT         03/28/25 0430    03/28/25 0430  Code Status and Medical Interventions: CPR (Attempt to Resuscitate); Full Support  Continuous         03/28/25 0430    03/28/25 0429  Inpatient Admission  Once         03/28/25 0430    03/28/25 0428  Pharmacy Consult - Pharmacy to dose  Continuous PRN,   Status:  Discontinued         03/28/25 0428    03/28/25 0427  Pharmacy to dose vancomycin  Continuous PRN,   Status:  Discontinued         03/28/25 0428    03/28/25 0426  Magnesium Standard Dose Replacement - Follow Nurse / BPA Driven Protocol  As Needed         03/28/25 0426    03/28/25 0425  potassium chloride (KLOR-CON M20) CR tablet 40 mEq  Once         03/28/25 0409    03/28/25 0425  Fentanyl, Urine - Urine, Clean Catch  Once         03/28/25 0424    03/28/25 0424  Urine Drug Screen - Urine, Clean Catch  STAT         03/28/25 0423    03/28/25 0356  Blood Culture - Blood, Arm, Right  Once         03/28/25 0355    03/28/25 0356  Blood Culture - Blood, Arm, Left  Once         03/28/25 0355    03/28/25 0356  C-reactive Protein  STAT         03/28/25 0355    03/28/25 0209  sodium chloride 0.9 % bolus 1,000 mL  Once         03/28/25 0153    03/28/25 0209  aztreonam (AZACTAM) 2 g in sodium chloride 0.9 % 100 mL IVPB-VTB  Once         03/28/25 0153    03/28/25 0154  Lactic Acid, Plasma  Once         03/28/25 0153    03/28/25 0153  Insert Peripheral IV  Once        Placed in \"And\" " "Linked Group    03/28/25 0153    03/28/25 0152  sodium chloride 0.9 % flush 10 mL  As Needed        Placed in \"And\" Linked Group    03/28/25 0153    03/28/25 0146  Urine Culture - Urine, Urine, Clean Catch  Once         03/28/25 0145    03/28/25 0134  Urinalysis, Microscopic Only - Urine, Clean Catch  Once         03/28/25 0133    03/28/25 0118  potassium chloride (KLOR-CON M20) CR tablet 40 mEq  Once         03/28/25 0102    03/28/25 0104  US Abdomen Limited  1 Time Imaging         03/28/25 0022    03/28/25 0103  Magnesium  Once         03/28/25 0102    03/28/25 0038  Scan Slide  Once         03/28/25 0037    03/28/25 0023  US Gallbladder  1 Time Imaging,   Status:  Canceled         03/28/25 0022    03/28/25 0002  ketorolac (TORADOL) injection 60 mg  Once         03/27/25 2346    03/28/25 0002  ondansetron ODT (ZOFRAN-ODT) disintegrating tablet 4 mg  Once         03/27/25 2346    03/28/25 0000  praziquantel (BILTRICIDE) 600 MG tablet  Once         03/28/25 0126    03/27/25 2312  ketorolac (TORADOL) injection 30 mg  Once,   Status:  Discontinued         03/27/25 2256    03/27/25 2312  ondansetron (ZOFRAN) injection 4 mg  Once,   Status:  Discontinued         03/27/25 2256    03/27/25 2312  sodium chloride 0.9 % bolus 1,000 mL  Once,   Status:  Discontinued         03/27/25 2256    03/27/25 2259  CT Abdomen Pelvis Without Contrast  1 Time Imaging        Comments: NON-CONTRASTED STUDY  If hCG negative    03/27/25 2258 03/27/25 2256  CT Abdomen Pelvis With Contrast  1 Time Imaging,   Status:  Canceled        Comments: IV CONTRAST ONLY  If hCG is negative    03/27/25 2256    03/27/25 2227  Insert Peripheral IV  Once        Placed in \"And\" Linked Group    03/27/25 2226    03/27/25 2227  [place] Draw  Once         03/27/25 2226 03/27/25 2227  CBC & Differential  Once         03/27/25 2226 03/27/25 2227  Comprehensive Metabolic Panel  Once         03/27/25 2226 03/27/25 2227  Lipase  Once         03/27/25 2226 " "   03/27/25 2227  hCG, Serum, Qualitative  STAT         03/27/25 2226 03/27/25 2227  Urinalysis With Culture If Indicated - Urine, Clean Catch  Once         03/27/25 2226 03/27/25 2227  Green Top (Gel)  PROCEDURE ONCE         03/27/25 2226 03/27/25 2227  Lavender Top  PROCEDURE ONCE         03/27/25 2226 03/27/25 2227  Gold Top - SST  PROCEDURE ONCE,   Status:  Canceled         03/27/25 2226 03/27/25 2227  Light Blue Top  PROCEDURE ONCE         03/27/25 2226 03/27/25 2227  CBC Auto Differential  PROCEDURE ONCE         03/27/25 2226 03/27/25 2226  sodium chloride 0.9 % flush 10 mL  As Needed,   Status:  Discontinued        Placed in \"And\" Linked Group    03/27/25 2226    Unscheduled  Up With Assistance  As Needed       03/28/25 0605    --  ascorbic acid (VITAMIN C) 500 MG tablet  Daily         03/28/25 0525    --  benzonatate (TESSALON) 200 MG capsule  3 Times Daily PRN         03/28/25 0525    --  buprenorphine-naloxone (SUBOXONE) 8-2 MG film film  Daily         03/28/25 0525    --  buPROPion XL (WELLBUTRIN XL) 150 MG 24 hr tablet  Daily         03/28/25 0525    --  calcium carbonate (OS-KIANNA) 600 MG tablet  Daily         03/28/25 0525    --  Cholecalciferol 25 MCG (1000 UT) tablet  Daily         03/28/25 0525    --  vitamin B-12 (CYANOCOBALAMIN) 500 MCG tablet  Daily         03/28/25 0525    --  esomeprazole (nexIUM) 40 MG capsule  Every Morning Before Breakfast         03/28/25 0525    --  fluticasone (FLONASE) 50 MCG/ACT nasal spray  Daily         03/28/25 0525    --  ibuprofen (ADVIL,MOTRIN) 600 MG tablet  Every 8 Hours PRN         03/28/25 0525    --  levothyroxine (SYNTHROID, LEVOTHROID) 50 MCG tablet  Every Early Morning         03/28/25 0525    --  ondansetron ODT (ZOFRAN-ODT) 4 MG disintegrating tablet  Every 8 Hours PRN         03/28/25 0525    --  magnesium oxide 250 MG tablet  Daily         03/28/25 0525    --  nicotine (NICODERM CQ) 21 MG/24HR patch  Daily PRN         03/28/25 0525 "    --  Pyridoxine HCl (Vitamin B6) 100 MG tablet  Daily         03/28/25 0525    --  thiamine (VITAMIN B-1) 100 MG tablet  tablet  Daily         03/28/25 0525    --  Omega 3 1000 MG capsule  Daily         03/28/25 0525    --  naloxone (NARCAN) 4 MG/0.1ML nasal spray  As Needed         03/28/25 0827                  Physician Progress Notes (all)    No notes of this type exist for this encounter.       Consult Notes (all)    No notes of this type exist for this encounter.

## 2025-03-28 NOTE — PROGRESS NOTES
[facility] HOSPITALIST PROGRESS NOTE    Subjective     History:   Marielena Martínez is a 35 y.o. female admitted on 3/27/2025 secondary to Pyelonephritis     Procedures: None    CC: Follow up UTI/pyelonephritis     Patient seen and examined with DELORES Goodwin. Awake and alert. Reports right-sided flank pain. Nausea appears improved with no further episodes of vomiting reported. No reported CP or dyspnea. No acute events reported.     History taken from: patient, chart, and RN.      Objective     Vital Signs  Temp:  [98.2 °F (36.8 °C)-99.2 °F (37.3 °C)] 98.2 °F (36.8 °C)  Heart Rate:  [] 106  Resp:  [16-18] 18  BP: ()/(62-77) 92/62    Intake/Output Summary (Last 24 hours) at 3/28/2025 1732  Last data filed at 3/28/2025 1714  Gross per 24 hour   Intake 600 ml   Output --   Net 600 ml         Physical Exam:  General:    Awake, alert, in no acute distress   Heart:      Normal S1 and S2. Regular rate and rhythm. No significant murmur, rubs or gallops appreciated.   Lungs:     Respirations regular, even and unlabored. Lungs clear to auscultation B/L. No wheezes, rales or rhonchi.   Abdomen:   Soft. Mild right-sided TTP. No guarding, rebound tenderness or  organomegaly noted. Bowel sounds present x 4. (+) right CVA tenderness.    Extremities:  No clubbing, cyanosis or edema noted. Moves UE and LE equally B/L.     Results Review:    Results from last 7 days   Lab Units 03/28/25  0736 03/28/25  0027   WBC 10*3/mm3 9.25 5.27   HEMOGLOBIN g/dL 9.7* 15.4   PLATELETS 10*3/mm3 185 127*     Results from last 7 days   Lab Units 03/28/25  0736 03/28/25  0027   SODIUM mmol/L 138 139   POTASSIUM mmol/L 3.4* 2.8*   CHLORIDE mmol/L 109* 105   CO2 mmol/L 16.8* 22.1   BUN mg/dL 19 20   CREATININE mg/dL 1.36* 1.46*   CALCIUM mg/dL 7.9* 8.6   GLUCOSE mg/dL 138* 120*     Results from last 7 days   Lab Units 03/28/25  0736 03/28/25  0027   BILIRUBIN mg/dL 0.3 0.3   ALK PHOS U/L 131* 166*   AST (SGOT) U/L 20 19    ALT (SGPT) U/L 17 18     Results from last 7 days   Lab Units 03/28/25  0027   MAGNESIUM mg/dL 1.8               Imaging Results (Last 24 Hours)       Procedure Component Value Units Date/Time    US Abdomen Limited [[id number]] Collected: 03/28/25 0138     Updated: 03/28/25 0144    Narrative:      EXAMINATION: Ultrasound abdomen limited     CLINICAL HISTORY: Abdominal pain     COMPARISON: None     FINDINGS:  The pancreas is not well seen. The gallbladder is contracted, thus  limiting its evaluation. This also accentuates wall thickness. No  gallstones are appreciated. No gallbladder wall thickening. There is no  intra or extrahepatic ductal dilatation. The common duct measures 2 mm.  The right kidney is enlarged measuring 15.9 cm in length and 5.6 x 6.3  cm. The findings are nonspecific. This may be developmental. No discrete  mass is appreciated.       Impression:      1. Slight enlargement of the right kidney. This may be developmental.  However, the findings are not entirely specific. No mass is appreciated.  No hydronephrosis is identified either.  2. Slightly contracted but otherwise normal-appearing gallbladder.     This report was finalized on 3/28/2025 1:42 AM by Laureano Armstrong MD.       CT Abdomen Pelvis Without Contrast [[id number]] Collected: 03/28/25 0133     Updated: 03/28/25 0140    Narrative:      EXAMINATION: CT abdomen and pelvis without contrast     HISTORY: Abdominal pain     COMPARISON: None available.     TECHNIQUE: Contiguous 4 mm thick slices were obtained through the  abdomen and pelvis without contrast. Coronal and sagittal reformats were  performed.     FINDINGS:  ABDOMEN:  No focal airspace disease is appreciated within the visualized lung  bases. The lack of intravenous contrast material limits evaluation of  the solid abdominal viscera. However, no contour deforming lesion is  appreciated within the unenhanced liver, spleen or adrenal glands. The  unenhanced pancreas appears unremarkable.  The right kidney is enlarged  relative to the left and there is slight loss of the normal fat within  the renal hilum. This may be developmental. However, the findings are  nonspecific. In the acute setting this may be related to pyelonephritis.  No definite ureteral calculus is appreciated. No adenopathy, free or  loculated collection is appreciated within the upper abdomen.     PELVIS:  Prominent formed colonic stool is noted suggesting constipation. The  appendix is well-visualized and is normal. There is no acute  appendicitis, colitis or diverticulitis. No free air. No significant  free or loculated collection is appreciated.     Evaluation of the bones demonstrates no acute or suspicious osseous  lesion.       Impression:      1. Asymmetric enlargement of the right kidney as above. There is also  questionable loss of the normal fat within the renal hilum. This may be  developmental in nature. However, an infectious process such as  pyelonephritis could result in a similar appearance. Recommend close  correlation with symptoms and urinalysis results.  2. No bowel obstruction or perforation.     This report was finalized on 3/28/2025 1:38 AM by Laureano Armstrong MD.                 Medications:  ascorbic acid, 500 mg, Oral, Daily  buprenorphine-naloxone, 1 tablet, Sublingual, Daily  buPROPion XL, 150 mg, Oral, Daily  calcium carbonate (oyster shell), 500 mg, Oral, Daily  cefTRIAXone, 2,000 mg, Intravenous, Q24H  cholecalciferol, 1,000 Units, Oral, Daily  fluticasone, 2 spray, Nasal, Daily  heparin (porcine), 5,000 Units, Subcutaneous, Q12H  levothyroxine, 50 mcg, Oral, QAM AC  magnesium oxide, 200 mg, Oral, Daily  pantoprazole, 40 mg, Oral, QAM AC  sodium chloride, 10 mL, Intravenous, Q12H  thiamine, 100 mg, Oral, Daily  vitamin B-12, 500 mcg, Oral, Daily  vitamin B-6, 100 mg, Oral, Daily      lactated ringers, 100 mL/hr, Last Rate: 100 mL/hr (03/28/25 0951)            Assessment & Plan   Acute UTI/right-sided  pyelonephritis: Initially placed on Vanc and Azactam on admission with concern for cephalosporin allergy. Upon further discussion, reported allergy not a true allergy and her antibiotic regimen has been deescalated to high dose Rocephin with a dose of gentamicin per ID recs. Follow cultures and repeat labs in the AM. ID input appreciated.     Hypokalemia: K+ improving with supplementation. Mg is 1.8. Cont electrolyte replacement protocols.     GERD: Cont PPI.    Anxiety: Cont home medication regimen.     Hx of substance use disorder: Resume home Suboxone.     DVT PPX: SQ heparin      Disposition Likely home when medically stable.     Terrance Kauffman,   03/28/25  17:32 EDT

## 2025-03-28 NOTE — ED NOTES
Patient was given urine collection supplies and instructed to provide a urine sample. Pt acknowledged but is unable to provide at this time.

## 2025-03-28 NOTE — H&P
[facility] Medicine Services  History & Physical    Patient Identification:  Name:  Marielena Martínez  Age:  35 y.o.  Sex:  female  :  1990  MRN:  [id number]   Visit Number:  [id number]  Admit Date: 3/27/2025   Primary Care Physician:  Charlie Schwab III, MD    Subjective       Chief Complaint   Patient presents with    Abdominal Pain       History of presenting illness:    Marielena Martínez is a 35 y.o. female who presented for further evaluation of generalized abdominal pain x 3 weeks    Past medical history is significant for chronic constipation, migraine headaches, history of substance abuse, recent incarceration-released 3 months ago.    This patient is alert and oriented, in no acute distress.  Patient states that she has been dealing with generalized abdominal pain and suprapubic tenderness for the last 3 weeks.  She reports having been treated for a UTI by her primary care doctor 3 weeks ago, with 2 oral medications that she does not remember the name of.  Patient states she did not have any change in symptoms after this treatment.  Patient reports she has suprapubic tenderness, urinary odor, hematuria, and a urethral tenderness when wiping.  Patient denies burning sensation with urination, urinary frequency, fever, chills, or bodyaches.  The patient has a history of chronic constipation and assumed her abdominal discomfort was due to this issue when the antibiotics did not help her symptoms.  She has performed multiple saline enemas at home with minimal effectiveness and no change in symptoms.  Patient is currently on Suboxone due to history of substance abuse.  Patient also reports generalized nausea with a few episodes of vomiting in the last week.  Patient reports right sided back pain over the last few days, especially with sudden movements or deep breaths.    ED, vital signs were /75 (BP Location: Right arm)   Pulse 99   Temp 98.7 °F (37.1 °C) (Oral)    "Resp 16   Ht 162.6 cm (64\")   Wt 66.7 kg (147 lb)   LMP 02/18/2025 (Approximate)   SpO2 97%   Breastfeeding No   BMI 25.23 kg/m²   ED workup significant for:   Potassium 2.8, creatinine 1.46, glucose 120, magnesium 1.8, albumin 2.7, WBC 5.27, CRP 22.51, lipase 8, hCG negative,  Urine tox screen positive for amphetamines, methamphetamines, and buprenorphine.  UA showed cloudy appearance, trace blood, positive nitrites, moderate leukocytes, 2+ protein, WBC too numerous to count, 4+ bacteria.  CT abdomen/pelvis shows asymmetric enlargement of the right kidney.  There is also questionable loss of the normal fat within the renal hilum.  This may be developmental in nature.  However, an infectious process such as pyelonephritis could result in a similar appearance.  Recommended close correlation with symptoms and urinalysis results.  No bowel obstruction or perforation.  US abdomen limited shows: Slight enlargement of the right kidney.  This may be developmental.  However, the findings are not entirely specific.  No mass is appreciated.  No hydronephrosis is identified either.  Slightly contracted but otherwise normal-appearing gallbladder.    Known Emergency Department medications received prior to my evaluation included:  Medications   sodium chloride 0.9 % flush 10 mL (has no administration in time range)   potassium chloride (KLOR-CON M20) CR tablet 40 mEq (has no administration in time range)   Magnesium Standard Dose Replacement - Follow Nurse / BPA Driven Protocol (has no administration in time range)   vancomycin 1250 mg/250 mL 0.9% NS IVPB ([facility]) (has no administration in time range)   vancomycin (VANCOCIN) 750 mg in 0.9% NaCl 250 mL (has no administration in time range)   aztreonam (AZACTAM) 2,000 mg in sodium chloride 0.9 % 100 mL IVPB-VTB (has no administration in time range)   ketorolac (TORADOL) injection 60 mg (60 mg Intramuscular Given 3/28/25 0008)   ondansetron ODT (ZOFRAN-ODT) disintegrating tablet " 4 mg (4 mg Oral Given 3/28/25 0002)   potassium chloride (KLOR-CON M20) CR tablet 40 mEq (40 mEq Oral Given 3/28/25 0119)   sodium chloride 0.9 % bolus 1,000 mL (1,000 mL Intravenous New Bag 3/28/25 0351)   aztreonam (AZACTAM) 2 g in sodium chloride 0.9 % 100 mL IVPB-VTB (0 g Intravenous Stopped 3/28/25 0896)       Room location at the time of my evaluation was [place] [address].     ---------------------------------------------------------------------------------------------------------------------   Review of Systems   Constitutional:  Negative for chills, fatigue and fever.   HENT:  Negative for postnasal drip, rhinorrhea, sinus pressure, sneezing and sore throat.    Eyes:  Negative for discharge and redness.   Respiratory:  Negative for cough, shortness of breath and wheezing.    Cardiovascular:  Negative for chest pain, palpitations and leg swelling.   Gastrointestinal:  Positive for abdominal pain, constipation and nausea. Negative for diarrhea and vomiting.   Genitourinary:  Positive for dysuria and hematuria. Negative for difficulty urinating and frequency.   Musculoskeletal:  Negative for arthralgias and joint swelling.   Skin:  Negative for rash and wound.   Neurological:  Negative for dizziness, speech difficulty, light-headedness and headaches.   Hematological:  Does not bruise/bleed easily.   Psychiatric/Behavioral:  Negative for confusion. The patient is not nervous/anxious.      ---------------------------------------------------------------------------------------------------------------------   Past Medical History:   Diagnosis Date    Anxiety     Arthritis     Injury of back     Migraine      No past surgical history on file.  Family History   Problem Relation Age of Onset    Anxiety disorder Mother     Arthritis Mother     Heart disease Paternal Grandfather     Stroke Paternal Grandfather     Lung disease Paternal Grandmother     Breast cancer Maternal Grandmother     Anxiety disorder Maternal Grandmother      Arthritis Maternal Grandmother     Hypertension Maternal Grandmother      Social History     Socioeconomic History    Marital status:    Tobacco Use    Smoking status: Every Day     Current packs/day: 0.50     Types: Cigarettes    Smokeless tobacco: Never   Substance and Sexual Activity    Alcohol use: No    Drug use: Yes     Types: IV     Comment: oxycodone 30mg iv daily but trying to quit; last intake approx 2wks ago; snorted last 3 days ago    Sexual activity: Never     ---------------------------------------------------------------------------------------------------------------------   Allergies:  Cephalexin, Tramadol hcl, and Bactrim [sulfamethoxazole-trimethoprim]  ---------------------------------------------------------------------------------------------------------------------   Home medications:  Medications below are reported home medications pulling from within the system; at this time, these medications have not been reconciled unless otherwise specified and are in the verification process for further verifcation as current home medications.  (Not in a hospital admission)      Hospital Scheduled Meds:  aztreonam, 2,000 mg, Intravenous, Q8H  potassium chloride, 40 mEq, Oral, Once  vancomycin, 750 mg, Intravenous, Q12H  vancomycin, 20 mg/kg, Intravenous, Once             Current listed hospital scheduled medications may not yet reflect those currently placed in orders that are signed and held awaiting patient's arrival to floor.   ---------------------------------------------------------------------------------------------------------------------     Objective     Vital Signs:  Temp:  [98.7 °F (37.1 °C)] 98.7 °F (37.1 °C)  Heart Rate:  [] 99  Resp:  [16-17] 16  BP: (100-109)/(66-77) 109/75      03/27/25 2221   Weight: 66.7 kg (147 lb)     Body mass index is 25.23 kg/m².  ---------------------------------------------------------------------------------------------------------------------      Physical Exam  Vitals reviewed.   Constitutional:       General: She is not in acute distress.     Appearance: Normal appearance.   HENT:      Head: Normocephalic and atraumatic.      Nose: Nose normal.      Mouth/Throat:      Mouth: Mucous membranes are moist.   Eyes:      Conjunctiva/sclera: Conjunctivae normal.      Pupils: Pupils are equal, round, and reactive to light.   Cardiovascular:      Rate and Rhythm: Normal rate and regular rhythm.      Pulses: Normal pulses.      Heart sounds: Normal heart sounds. No murmur heard.  Pulmonary:      Effort: Pulmonary effort is normal. No respiratory distress.      Breath sounds: Normal breath sounds. No wheezing or rales.   Abdominal:      General: Bowel sounds are normal. There is no distension.      Palpations: Abdomen is soft.      Tenderness: There is abdominal tenderness in the epigastric area, suprapubic area and left upper quadrant. There is right CVA tenderness. There is no left CVA tenderness.   Musculoskeletal:         General: No deformity.      Right lower leg: No edema.      Left lower leg: No edema.   Skin:     General: Skin is warm and dry.      Findings: No erythema, lesion or rash.   Neurological:      Mental Status: She is alert and oriented to person, place, and time. Mental status is at baseline.   Psychiatric:         Mood and Affect: Mood normal.         Behavior: Behavior normal.       ---------------------------------------------------------------------------------------------------------------------  EKG:    Ordered - Pending    I have personally looked at the EKG.  ---------------------------------------------------------------------------------------------------------------------   Results from last 7 days   Lab Units 03/28/25  0459 03/28/25  0027   CRP mg/dL  --  22.51*   LACTATE mmol/L 0.8  --    WBC 10*3/mm3  --  5.27   HEMOGLOBIN g/dL  --  15.4   HEMATOCRIT %  --  46.9*   MCV fL  --  82.4   MCHC g/dL  --  32.8   PLATELETS 10*3/mm3  --   "127*         Results from last 7 days   Lab Units 03/28/25  0027   SODIUM mmol/L 139   POTASSIUM mmol/L 2.8*   MAGNESIUM mg/dL 1.8   CHLORIDE mmol/L 105   CO2 mmol/L 22.1   BUN mg/dL 20   CREATININE mg/dL 1.46*   CALCIUM mg/dL 8.6   GLUCOSE mg/dL 120*   ALBUMIN g/dL 2.7*   BILIRUBIN mg/dL 0.3   ALK PHOS U/L 166*   AST (SGOT) U/L 19   ALT (SGPT) U/L 18   Estimated Creatinine Clearance: 50 mL/min (A) (by C-G formula based on SCr of 1.46 mg/dL (H)).  No results found for: \"AMMONIA\"          No results found for: \"HGBA1C\"  Lab Results   Component Value Date    TSH 1.592 08/25/2015     Lab Results   Component Value Date    PREGTESTUR Positive (A) 11/18/2015    HCGQUANT 62,758 (H) 11/30/2015     Pain Management Panel  More data exists         Latest Ref Rng & Units 3/28/2025 11/12/2017   Pain Management Panel   Amphetamine, Urine Qual Negative Positive  Negative    Barbiturates Screen, Urine Negative Negative  Positive    Benzodiazepine Screen, Urine Negative Negative  Negative    Buprenorphine, Screen, Urine Negative Positive  Negative    Cocaine Screen, Urine Negative Negative  Negative    Fentanyl, Urine Negative Negative  -   Methadone Screen , Urine Negative Negative  Negative    Methamphetamine, Ur Negative Positive  -     No results found for: \"BLOODCX\"  No results found for: \"URINECX\"  No results found for: \"WOUNDCX\"  No results found for: \"STOOLCX\"      ---------------------------------------------------------------------------------------------------------------------  Imaging Results (Last 7 Days)       Procedure Component Value Units Date/Time    US Abdomen Limited [[id number]] Collected: 03/28/25 0138     Updated: 03/28/25 0144    Narrative:      EXAMINATION: Ultrasound abdomen limited     CLINICAL HISTORY: Abdominal pain     COMPARISON: None     FINDINGS:  The pancreas is not well seen. The gallbladder is contracted, thus  limiting its evaluation. This also accentuates wall thickness. No  gallstones are " appreciated. No gallbladder wall thickening. There is no  intra or extrahepatic ductal dilatation. The common duct measures 2 mm.  The right kidney is enlarged measuring 15.9 cm in length and 5.6 x 6.3  cm. The findings are nonspecific. This may be developmental. No discrete  mass is appreciated.       Impression:      1. Slight enlargement of the right kidney. This may be developmental.  However, the findings are not entirely specific. No mass is appreciated.  No hydronephrosis is identified either.  2. Slightly contracted but otherwise normal-appearing gallbladder.     This report was finalized on 3/28/2025 1:42 AM by Laureano Armstrong MD.       CT Abdomen Pelvis Without Contrast [[id number]] Collected: 03/28/25 0133     Updated: 03/28/25 0140    Narrative:      EXAMINATION: CT abdomen and pelvis without contrast     HISTORY: Abdominal pain     COMPARISON: None available.     TECHNIQUE: Contiguous 4 mm thick slices were obtained through the  abdomen and pelvis without contrast. Coronal and sagittal reformats were  performed.     FINDINGS:  ABDOMEN:  No focal airspace disease is appreciated within the visualized lung  bases. The lack of intravenous contrast material limits evaluation of  the solid abdominal viscera. However, no contour deforming lesion is  appreciated within the unenhanced liver, spleen or adrenal glands. The  unenhanced pancreas appears unremarkable. The right kidney is enlarged  relative to the left and there is slight loss of the normal fat within  the renal hilum. This may be developmental. However, the findings are  nonspecific. In the acute setting this may be related to pyelonephritis.  No definite ureteral calculus is appreciated. No adenopathy, free or  loculated collection is appreciated within the upper abdomen.     PELVIS:  Prominent formed colonic stool is noted suggesting constipation. The  appendix is well-visualized and is normal. There is no acute  appendicitis, colitis or  "diverticulitis. No free air. No significant  free or loculated collection is appreciated.     Evaluation of the bones demonstrates no acute or suspicious osseous  lesion.       Impression:      1. Asymmetric enlargement of the right kidney as above. There is also  questionable loss of the normal fat within the renal hilum. This may be  developmental in nature. However, an infectious process such as  pyelonephritis could result in a similar appearance. Recommend close  correlation with symptoms and urinalysis results.  2. No bowel obstruction or perforation.     This report was finalized on 3/28/2025 1:38 AM by Laureano Armstrong MD.               Cultures:  No results found for: \"BLOODCX\", \"URINECX\", \"WOUNDCX\", \"MRSACX\", \"RESPCX\", \"STOOLCX\"    Last echocardiogram:      I have personally reviewed the above radiology images and read the final radiology report on 03/28/25  ---------------------------------------------------------------------------------------------------------------------  Assessment / Plan     Active Hospital Problems    Diagnosis  POA    **Pyelonephritis [N12]  Yes       ASSESSMENT/PLAN:  Acute pyelonephritis, POA  Patient presents with generalized abdominal pain, suprapubic tenderness, urinary odor, hematuria, right sided back pain with right CVA tenderness.  UA showed cloudy appearance, trace blood, positive nitrites, moderate leukocytes, 2+ protein, WBC too numerous to count, 4+ bacteria.  CT abdomen/pelvis shows asymmetric enlargement of the right kidney.  There is also questionable loss of the normal fat within the renal hilum.  This may be developmental in nature.  However, an infectious process such as pyelonephritis could result in a similar appearance.  Recommended close correlation with symptoms and urinalysis results.  No bowel obstruction or perforation.  US abdomen limited shows: Slight enlargement of the right kidney.  This may be developmental.  However, the findings are not entirely " specific.  No mass is appreciated.  No hydronephrosis is identified either.  Slightly contracted but otherwise normal-appearing gallbladder.  Continue aztreonam due to antibiotic allergies, follow up cultures.  Patient does not meet sepsis criteria at this time. Continue to monitor vital signs.  Acute constipation, POA  CT abdomen/pelvis notes: Prominent formed colonic stool is noted suggesting constipation. The appendix is well-visualized and is normal. There is no acute appendicitis, colitis or diverticulitis. No free air. No significant free or loculated collection is appreciated.   Patient expressed concern for tapeworms due to recent history of incarceration.  Stool ova and parasite lab ordered  Bowel regimen including Doc-senna and MiraLAX.   Hypokalemia, secondary to GI losses/poor oral intake  Potassium replacement protocol started, continue to replace per protocol.  Monitor EKG       Chronic Conditions  Anxiety  Arthritis  GERD  Migraine headaches  History of substance abuse  Review and continue appropriate home medications per med rec once completed by pharmacy      ----------  -DVT prophylaxis: Subcu heparin  -Activity: Up with assistance  -Expected length of stay: INPATIENT status due to the need for care which can only be reasonably provided in an hospital setting such as aggressive/expedited ancillary services and/or consultation services, the necessity for IV medications, close physician monitoring and/or the possible need for procedures.  In such, I feel patient’s risk for adverse outcomes and need for care warrant INPATIENT evaluation and predict the patient’s care encounter to likely last beyond 2 midnights.    -Disposition: Pending clinical course     High risk secondary to acute pyelonephritis    Code Status and Medical Interventions: CPR (Attempt to Resuscitate); Full Support   Ordered at: 03/28/25 0430     Code Status (Patient has no pulse and is not breathing):    CPR (Attempt to Resuscitate)      Medical Interventions (Patient has pulse or is breathing):    Full Support       iBn Mcdonald PA-C   03/28/25  05:21 EDT

## 2025-03-28 NOTE — PROGRESS NOTES
Pharmacy was consulted to dose aztreonam and vancomycin for pyelonephritis. Based on an estimated CrCl of 50mL/min, an extended infusion aztreonam dose of 2g q8hr and vancomycin 750mg q12hr has been ordered following the initial 1250mg vancomycin loading dose. A vancomycin trough will be obtained and the dose will be adjusted as needed to maintain a therapeutic AUC concentration of 400-600mg/L.hr. Thank you for the consult. Pharmacy will continue to follow.     Thank you,   Kamala Cox, PharmD  04:41 EDT

## 2025-03-28 NOTE — NURSING NOTE
Pt states she needs something to help her have a BM. Pt states she has not had a BM in 6 days. Pt stated she uses stool softeners and miralax at home and they have not been effective. Pt refused PO stool softener and miralax. Pt given PO dulcolax per pt request.

## 2025-03-28 NOTE — CONSULTS
INFECTIOUS DISEASE CONSULTATION REPORT        Patient Identification:  Name:  Marielena Martínez  Age:  35 y.o.  Sex:  female  :  1990  MRN:  [id number]   Visit Number:  [id number]  Primary Care Physician:  Max Durand PA-C        Referring Provider: Dr. Kauffman    Reason for consult: Pyelonephritis       LOS: 0 days        Subjective       Subjective     History of present illness:      Thank you Dr. Kauffman for allowing us to participate in the care of your patient.  As you well know, Ms. Marielena Matrínez is a 35 y.o. female with past medical history significant for Chronic constipation, migraine headaches, substance abuse, recent incarceration, who presented to [facility] Emergency Department on 3/27/2025 for abdominal and suprapubic tenderness.  WBC 9.25.  CRP 17.83.  Procalcitonin elevated at 4.86.  Lactic acid normal at 0.8.  Blood cultures from 3/28/2025 currently in process.  Urinalysis from 3/28/2025 positive with culture currently in progress.  CT of the abdomen pelvis reports asymmetric enlargement of the right kidney with questionable loss of normal fat within the renal hilum, this may be developmental in nature however infectious process such as pyelonephritis could result in similar appearance, no bowel obstruction or perforation.    Patient sitting up in bed this morning.  Primary RN at bedside.  Currently on room air with no apparent distress.  Lungs clear to auscultation bilaterally.  Suprapubic tenderness to palpation.  Right-sided CVA tenderness.  Denies dysuria, urgency or frequency.      Infectious Disease consultation was requested for antimicrobial management.      ---------------------------------------------------------------------------------------------------------------------     Review Of Systems:    Constitutional: no fever, chills and night sweats. No appetite change or unexpected weight change. No fatigue.  Eyes: no eye drainage, itching or  redness.  HEENT: no mouth sores, dysphagia or nose bleed.  Respiratory: no for shortness of breath, cough or production of sputum.  Cardiovascular: no chest pain, no palpitations, no orthopnea.  Gastrointestinal: no nausea, vomiting or diarrhea.  Positive suprapubic abdominal pain, no hematemesis or rectal bleeding.  Genitourinary: no dysuria or polyuria.  Hematologic/lymphatic: no lymph node abnormalities, no easy bruising or easy bleeding.  Musculoskeletal: Right CVA tenderness.   Skin: No rash and no itching.  Neurological: no loss of consciousness, no seizure, no headache.  Behavioral/Psych: no depression or suicidal ideation.  Endocrine: no hot flashes.  Immunologic: negative.    ---------------------------------------------------------------------------------------------------------------------     Past Medical History    Past Medical History:   Diagnosis Date    Anxiety     Arthritis     Injury of back     Migraine        Past Surgical History    History reviewed. No pertinent surgical history.    Family History    Family History   Problem Relation Age of Onset    Anxiety disorder Mother     Arthritis Mother     Heart disease Paternal Grandfather     Stroke Paternal Grandfather     Lung disease Paternal Grandmother     Breast cancer Maternal Grandmother     Anxiety disorder Maternal Grandmother     Arthritis Maternal Grandmother     Hypertension Maternal Grandmother        Social History    Social History     Tobacco Use    Smoking status: Every Day     Current packs/day: 0.50     Types: Cigarettes    Smokeless tobacco: Never   Vaping Use    Vaping status: Every Day    Substances: Nicotine    Devices: Disposable   Substance Use Topics    Alcohol use: No    Drug use: Yes     Types: IV     Comment: oxycodone 30mg iv daily but trying to quit; last intake approx 2wks ago; snorted last 3 days ago       Allergies    Cephalexin, Tramadol hcl, and Bactrim  [sulfamethoxazole-trimethoprim]  ---------------------------------------------------------------------------------------------------------------------     Home Medications:    Prior to Admission Medications       Prescriptions Last Dose Informant Patient Reported? Taking?    ascorbic acid (VITAMIN C) 500 MG tablet 3/27/2025 Self, Other Yes Yes    Take 1 tablet by mouth Daily.    buprenorphine-naloxone (SUBOXONE) 8-2 MG film film 3/27/2025 Self, Other Yes Yes    Place 1 film under the tongue Daily.    buPROPion XL (WELLBUTRIN XL) 150 MG 24 hr tablet 3/27/2025 Self, Other Yes Yes    Take 1 tablet by mouth Daily.    calcium carbonate (OS-KIANNA) 600 MG tablet 3/27/2025 Self, Other Yes Yes    Take 1 tablet by mouth Daily.    Cholecalciferol 25 MCG (1000 UT) tablet 3/27/2025 Self, Other Yes Yes    Take 1 tablet by mouth Daily.    fluticasone (FLONASE) 50 MCG/ACT nasal spray 3/27/2025 Self, Other Yes Yes    Administer 2 sprays into the nostril(s) as directed by provider Daily.    levothyroxine (SYNTHROID, LEVOTHROID) 50 MCG tablet 3/27/2025 Self, Other Yes Yes    Take 1 tablet by mouth Every Morning.    magnesium oxide 250 MG tablet 3/27/2025 Self, Other Yes Yes    Take 1 tablet by mouth Daily.    Omega 3 1000 MG capsule 3/27/2025 Self, Other Yes Yes    Take 1,000 mg by mouth Daily.    Pyridoxine HCl (Vitamin B6) 100 MG tablet 3/27/2025 Self, Other Yes Yes    Take 100 mg by mouth Daily.    thiamine (VITAMIN B-1) 100 MG tablet  tablet 3/27/2025 Self, Other Yes Yes    Take 1 tablet by mouth Daily.    vitamin B-12 (CYANOCOBALAMIN) 500 MCG tablet 3/27/2025 Self, Other Yes Yes    Take 1 tablet by mouth Daily.    benzonatate (TESSALON) 200 MG capsule Unknown Self, Other Yes No    Take 1 capsule by mouth 3 (Three) Times a Day As Needed for Cough.    esomeprazole (nexIUM) 40 MG capsule Unknown Self, Other Yes No    Take 1 capsule by mouth Every Morning Before Breakfast.    ibuprofen (ADVIL,MOTRIN) 600 MG tablet Unknown Self, Other  Yes No    Take 1 tablet by mouth Every 8 (Eight) Hours As Needed for Mild Pain.    naloxone (NARCAN) 4 MG/0.1ML nasal spray  Pharmacy Yes No    Administer 1 spray into the nostril(s) as directed by provider As Needed for Opioid Reversal. Call 911. Don't prime. [place] in 1 nostril for overdose. Repeat in 2-3 minutes in other nostril if no or minimal breathing/responsiveness.    nicotine (NICODERM CQ) 21 MG/24HR patch Unknown Self, Other Yes No    Place 1 patch on the skin as directed by provider Daily As Needed (Nicotine Withdrawl).    ondansetron ODT (ZOFRAN-ODT) 4 MG disintegrating tablet Unknown Self, Other Yes No    Place 1 tablet on the tongue Every 8 (Eight) Hours As Needed for Nausea or Vomiting.          ---------------------------------------------------------------------------------------------------------------------    Objective       Objective     Hospital Scheduled Meds:  ascorbic acid, 500 mg, Oral, Daily  buprenorphine-naloxone, 1 tablet, Sublingual, Daily  buPROPion XL, 150 mg, Oral, Daily  calcium carbonate (oyster shell), 500 mg, Oral, Daily  cefTRIAXone, 2,000 mg, Intravenous, Q24H  cholecalciferol, 1,000 Units, Oral, Daily  fluticasone, 2 spray, Nasal, Daily  gentamicin, 5 mg/kg (Adjusted), Intravenous, Once  heparin (porcine), 5,000 Units, Subcutaneous, Q12H  levothyroxine, 50 mcg, Oral, QAM AC  magnesium oxide, 200 mg, Oral, Daily  pantoprazole, 40 mg, Oral, QAM AC  sodium chloride, 10 mL, Intravenous, Q12H  thiamine, 100 mg, Oral, Daily  vitamin B-12, 500 mcg, Oral, Daily  vitamin B-6, 100 mg, Oral, Daily      lactated ringers, 100 mL/hr, Last Rate: 100 mL/hr (03/28/25 0943)      ---------------------------------------------------------------------------------------------------------------------   Vital Signs:  Temp:  [98.3 °F (36.8 °C)-98.7 °F (37.1 °C)] 98.3 °F (36.8 °C)  Heart Rate:  [] 100  Resp:  [16-17] 16  BP: ()/(64-77) 98/64  Mean Arterial Pressure (Non-Invasive) for the  past 24 hrs (Last 3 readings):   Noninvasive MAP (mmHg)   03/28/25 0639 77   03/28/25 0127 87     SpO2 Percentage    03/28/25 0130 03/28/25 0458 03/28/25 0639   SpO2: 99% 97% 96%     SpO2:  [96 %-99 %] 96 %  on   ;   Device (Oxygen Therapy): room air    Body mass index is 26.26 kg/m².  Wt Readings from Last 3 Encounters:   03/28/25 69.4 kg (153 lb)   11/12/17 65.8 kg (145 lb)   06/16/17 73.5 kg (162 lb)     ---------------------------------------------------------------------------------------------------------------------     Physical Exam:    Constitutional:  Well-developed and well-nourished.  No respiratory distress.      HENT:  Head: Normocephalic and atraumatic.  Mouth:  Moist mucous membranes.    Eyes:  Conjunctivae and EOM are normal.  No scleral icterus.  Neck:  Neck supple.  No JVD present.    Cardiovascular:  Normal rate, regular rhythm and normal heart sounds with no murmur. No edema.  Pulmonary/Chest:  No respiratory distress, no wheezes, no crackles, with normal breath sounds and good air movement.  Abdominal:  Soft.  Bowel sounds are normal.  No distension.  Positive suprapubic tenderness.  Right CVA tenderness.  Musculoskeletal:  No edema, no tenderness, and no deformity.  No swelling or redness of joints.  Neurological:  Alert and oriented to person, place, and time.  No facial droop.  No slurred speech.   Skin:  Skin is warm and dry.  No rash noted.  No pallor.   Psychiatric:  Normal mood and affect.  Behavior is normal.    ---------------------------------------------------------------------------------------------------------------------              Results from last 7 days   Lab Units 03/28/25  0736 03/28/25  0459 03/28/25  0027   CRP mg/dL 17.83*  --  22.51*   LACTATE mmol/L  --  0.8  --    WBC 10*3/mm3 9.25  --  5.27   HEMOGLOBIN g/dL 9.7*  --  15.4   HEMATOCRIT % 30.7*  --  46.9*   MCV fL 86.7  --  82.4   MCHC g/dL 31.6  --  32.8   PLATELETS 10*3/mm3 185  --  127*     Results from last 7  "days   Lab Units 03/28/25  0736 03/28/25  0027   SODIUM mmol/L 138 139   POTASSIUM mmol/L 3.4* 2.8*   MAGNESIUM mg/dL  --  1.8   CHLORIDE mmol/L 109* 105   CO2 mmol/L 16.8* 22.1   BUN mg/dL 19 20   CREATININE mg/dL 1.36* 1.46*   CALCIUM mg/dL 7.9* 8.6   GLUCOSE mg/dL 138* 120*   ALBUMIN g/dL 2.0* 2.7*   BILIRUBIN mg/dL 0.3 0.3   ALK PHOS U/L 131* 166*   AST (SGOT) U/L 20 19   ALT (SGPT) U/L 17 18   Estimated Creatinine Clearance: 54.7 mL/min (A) (by C-G formula based on SCr of 1.36 mg/dL (H)).  No results found for: \"AMMONIA\"    No results found for: \"HGBA1C\", \"POCGLU\"  No results found for: \"HGBA1C\"  Lab Results   Component Value Date    TSH 1.592 08/25/2015       No results found for: \"BLOODCX\"  No results found for: \"URINECX\"  No results found for: \"WOUNDCX\"  No results found for: \"STOOLCX\"  No results found for: \"RESPCX\"  Pain Management Panel  More data exists         Latest Ref Rng & Units 3/28/2025 11/12/2017   Pain Management Panel   Amphetamine, Urine Qual Negative Positive  Negative    Barbiturates Screen, Urine Negative Negative  Positive    Benzodiazepine Screen, Urine Negative Negative  Negative    Buprenorphine, Screen, Urine Negative Positive  Negative    Cocaine Screen, Urine Negative Negative  Negative    Fentanyl, Urine Negative Negative  -   Methadone Screen , Urine Negative Negative  Negative    Methamphetamine, Ur Negative Positive  -       ---------------------------------------------------------------------------------------------------------------------  Imaging Results (Last 7 Days)       Procedure Component Value Units Date/Time    US Abdomen Limited [[id number]] Collected: 03/28/25 0138     Updated: 03/28/25 0144    Narrative:      EXAMINATION: Ultrasound abdomen limited     CLINICAL HISTORY: Abdominal pain     COMPARISON: None     FINDINGS:  The pancreas is not well seen. The gallbladder is contracted, thus  limiting its evaluation. This also accentuates wall thickness. No  gallstones are " appreciated. No gallbladder wall thickening. There is no  intra or extrahepatic ductal dilatation. The common duct measures 2 mm.  The right kidney is enlarged measuring 15.9 cm in length and 5.6 x 6.3  cm. The findings are nonspecific. This may be developmental. No discrete  mass is appreciated.       Impression:      1. Slight enlargement of the right kidney. This may be developmental.  However, the findings are not entirely specific. No mass is appreciated.  No hydronephrosis is identified either.  2. Slightly contracted but otherwise normal-appearing gallbladder.     This report was finalized on 3/28/2025 1:42 AM by Laureano Armstrong MD.       CT Abdomen Pelvis Without Contrast [[id number]] Collected: 03/28/25 0133     Updated: 03/28/25 0140    Narrative:      EXAMINATION: CT abdomen and pelvis without contrast     HISTORY: Abdominal pain     COMPARISON: None available.     TECHNIQUE: Contiguous 4 mm thick slices were obtained through the  abdomen and pelvis without contrast. Coronal and sagittal reformats were  performed.     FINDINGS:  ABDOMEN:  No focal airspace disease is appreciated within the visualized lung  bases. The lack of intravenous contrast material limits evaluation of  the solid abdominal viscera. However, no contour deforming lesion is  appreciated within the unenhanced liver, spleen or adrenal glands. The  unenhanced pancreas appears unremarkable. The right kidney is enlarged  relative to the left and there is slight loss of the normal fat within  the renal hilum. This may be developmental. However, the findings are  nonspecific. In the acute setting this may be related to pyelonephritis.  No definite ureteral calculus is appreciated. No adenopathy, free or  loculated collection is appreciated within the upper abdomen.     PELVIS:  Prominent formed colonic stool is noted suggesting constipation. The  appendix is well-visualized and is normal. There is no acute  appendicitis, colitis or  diverticulitis. No free air. No significant  free or loculated collection is appreciated.     Evaluation of the bones demonstrates no acute or suspicious osseous  lesion.       Impression:      1. Asymmetric enlargement of the right kidney as above. There is also  questionable loss of the normal fat within the renal hilum. This may be  developmental in nature. However, an infectious process such as  pyelonephritis could result in a similar appearance. Recommend close  correlation with symptoms and urinalysis results.  2. No bowel obstruction or perforation.     This report was finalized on 3/28/2025 1:38 AM by Laureano Armstrong MD.               ---------------------------------------------------------------------------------------------------------------------      Pertinent Infectious Disease Results          Assessment & Plan      Assessment        Acute pyelonephritis      Plan      Patient presented to [facility] Emergency Department on 3/27/2025 for abdominal and suprapubic tenderness.  WBC 9.25.  CRP 17.83.  Procalcitonin elevated at 4.86.  Lactic acid normal at 0.8.  Blood cultures from 3/28/2025 currently in process.  Urinalysis from 3/28/2025 positive with culture currently in progress.  CT of the abdomen pelvis reports asymmetric enlargement of the right kidney with questionable loss of normal fat within the renal hilum, this may be developmental in nature however infectious process such as pyelonephritis could result in similar appearance, no bowel obstruction or perforation.    Patient sitting up in bed this morning.  Primary RN at bedside.  Currently on room air with no apparent distress.  Lungs clear to auscultation bilaterally.  Suprapubic tenderness to palpation.  Right-sided CVA tenderness.  Denies dysuria, urgency or frequency.    Patient reports cephalexin allergy to be nausea secondary to not eating when taking the antibiotic.  Therefore aztreonam and vancomycin were discontinued.   Ceftriaxone 2 g IV every 24 hours was initiated pending finalization of culture results.  One-time dose of gentamicin 5 mg/kg IV ordered for today.  We will continue to follow closely and adjust antibiotic therapy as needed.    RECENT ANTIMICROBIAL THERAPY    cefTRIAXone (ROCEPHIN) 2000 mg IVPB in 100 mL NS (VTB)  gentamicin IVPB in 100 mL (extended interval dosing)       Again, thank you Dr. Kauffman for allowing us to participate in the care of your patient and please feel free to call for any questions you may have.        Code Status:     Code Status and Medical Interventions: CPR (Attempt to Resuscitate); Full Support   Ordered at: 03/28/25 0430     Code Status (Patient has no pulse and is not breathing):    CPR (Attempt to Resuscitate)     Medical Interventions (Patient has pulse or is breathing):    Full Support         HEVER Wilson  03/28/25  12:25 EDT

## 2025-03-28 NOTE — NURSING NOTE
Patient admitted to Room 342A from the ED with generalized abdominal pain. VSS; she is resting comfortably. Will continue with plan of care.

## 2025-03-28 NOTE — ED PROVIDER NOTES
Subjective   History of Present Illness  Patient is a 35-year-old female who complains of generalized abdominal pain that started yesterday.  States that she has been constipated not had a bowel movement for a week.  She has had some nausea without vomiting.  She states that earlier today she gave himself a saline enema and thinks that she may have passed some worms.  She took photographs of her stool with her cell phone and showed them to me, based upon this it is possible that the patient could have tapeworms.  Not definitive.  She denies fever, chills, chest pain, shortness of breath, hematemesis, hematochezia or melena, syncope or near syncope, focal numbness or weakness, other symptoms or other complaints.        Review of Systems   All other systems reviewed and are negative.      Past Medical History:   Diagnosis Date    Anxiety     Arthritis     Injury of back     Migraine        Allergies   Allergen Reactions    Cephalexin     Tramadol Hcl     Bactrim [Sulfamethoxazole-Trimethoprim] Rash       No past surgical history on file.    Family History   Problem Relation Age of Onset    Anxiety disorder Mother     Arthritis Mother     Heart disease Paternal Grandfather     Stroke Paternal Grandfather     Lung disease Paternal Grandmother     Breast cancer Maternal Grandmother     Anxiety disorder Maternal Grandmother     Arthritis Maternal Grandmother     Hypertension Maternal Grandmother        Social History     Socioeconomic History    Marital status:    Tobacco Use    Smoking status: Every Day     Current packs/day: 0.50     Types: Cigarettes    Smokeless tobacco: Never   Substance and Sexual Activity    Alcohol use: No    Drug use: Yes     Types: IV     Comment: oxycodone 30mg iv daily but trying to quit; last intake approx 2wks ago; snorted last 3 days ago    Sexual activity: Never           Objective   Physical Exam  Vitals and nursing note reviewed.   Constitutional:       General: She is not in  acute distress.     Appearance: Normal appearance. She is well-developed. She is not ill-appearing, toxic-appearing or diaphoretic.      Comments: Well-developed well-nourished female, alert and well-oriented, in no apparent acute distress.  She does not appear acutely ill.   HENT:      Head: Normocephalic and atraumatic.   Eyes:      General: No scleral icterus.     Extraocular Movements: Extraocular movements intact.      Pupils: Pupils are equal, round, and reactive to light.   Neck:      Trachea: No tracheal deviation.   Cardiovascular:      Rate and Rhythm: Normal rate and regular rhythm.   Pulmonary:      Effort: Pulmonary effort is normal. No respiratory distress.      Breath sounds: Normal breath sounds.   Chest:      Chest wall: No tenderness.   Abdominal:      General: Bowel sounds are normal.      Palpations: Abdomen is soft.      Tenderness: There is abdominal tenderness (Minimal generalized tenderness, slightly more prominent in the right upper quadrant and epigastrium, no other focal findings.  No acute peritoneal signs.). There is no right CVA tenderness, left CVA tenderness, guarding or rebound.   Musculoskeletal:         General: No tenderness. Normal range of motion.      Cervical back: Normal range of motion and neck supple. No rigidity or tenderness.      Right lower leg: No edema.      Left lower leg: No edema.   Skin:     General: Skin is warm and dry.      Capillary Refill: Capillary refill takes less than 2 seconds.      Coloration: Skin is not pale.   Neurological:      General: No focal deficit present.      Mental Status: She is alert and oriented to person, place, and time.      GCS: GCS eye subscore is 4. GCS verbal subscore is 5. GCS motor subscore is 6.      Motor: No abnormal muscle tone.   Psychiatric:         Mood and Affect: Mood normal.         Behavior: Behavior normal.       Results for orders placed or performed during the hospital encounter of 03/27/25   Comprehensive  Metabolic Panel    Collection Time: 03/28/25 12:27 AM    Specimen: Arm, Right; Blood   Result Value Ref Range    Glucose 120 (H) 65 - 99 mg/dL    BUN 20 6 - 20 mg/dL    Creatinine 1.46 (H) 0.57 - 1.00 mg/dL    Sodium 139 136 - 145 mmol/L    Potassium 2.8 (L) 3.5 - 5.2 mmol/L    Chloride 105 98 - 107 mmol/L    CO2 22.1 22.0 - 29.0 mmol/L    Calcium 8.6 8.6 - 10.5 mg/dL    Total Protein 6.2 6.0 - 8.5 g/dL    Albumin 2.7 (L) 3.5 - 5.2 g/dL    ALT (SGPT) 18 1 - 33 U/L    AST (SGOT) 19 1 - 32 U/L    Alkaline Phosphatase 166 (H) 39 - 117 U/L    Total Bilirubin 0.3 0.0 - 1.2 mg/dL    Globulin 3.5 gm/dL    A/G Ratio 0.8 g/dL    BUN/Creatinine Ratio 13.7 7.0 - 25.0    Anion Gap 11.9 5.0 - 15.0 mmol/L    eGFR 47.6 (L) >60.0 mL/min/1.73   Lipase    Collection Time: 03/28/25 12:27 AM    Specimen: Arm, Right; Blood   Result Value Ref Range    Lipase 8 (L) 13 - 60 U/L   hCG, Serum, Qualitative    Collection Time: 03/28/25 12:27 AM    Specimen: Arm, Right; Blood   Result Value Ref Range    HCG Qualitative Negative Negative   CBC Auto Differential    Collection Time: 03/28/25 12:27 AM    Specimen: Arm, Right; Blood   Result Value Ref Range    WBC 5.27 3.40 - 10.80 10*3/mm3    RBC 5.69 (H) 3.77 - 5.28 10*6/mm3    Hemoglobin 15.4 12.0 - 15.9 g/dL    Hematocrit 46.9 (H) 34.0 - 46.6 %    MCV 82.4 79.0 - 97.0 fL    MCH 27.1 26.6 - 33.0 pg    MCHC 32.8 31.5 - 35.7 g/dL    RDW 14.2 12.3 - 15.4 %    RDW-SD 42.5 37.0 - 54.0 fl    MPV 10.4 6.0 - 12.0 fL    Platelets 127 (L) 140 - 450 10*3/mm3    Neutrophil % 82.6 (H) 42.7 - 76.0 %    Lymphocyte % 5.9 (L) 19.6 - 45.3 %    Monocyte % 9.5 5.0 - 12.0 %    Eosinophil % 0.8 0.3 - 6.2 %    Basophil % 0.8 0.0 - 1.5 %    Immature Grans % 0.4 0.0 - 0.5 %    Neutrophils, Absolute 4.36 1.70 - 7.00 10*3/mm3    Lymphocytes, Absolute 0.31 (L) 0.70 - 3.10 10*3/mm3    Monocytes, Absolute 0.50 0.10 - 0.90 10*3/mm3    Eosinophils, Absolute 0.04 0.00 - 0.40 10*3/mm3    Basophils, Absolute 0.04 0.00 - 0.20  10*3/mm3    Immature Grans, Absolute 0.02 0.00 - 0.05 10*3/mm3    nRBC 0.0 0.0 - 0.2 /100 WBC   Scan Slide    Collection Time: 03/28/25 12:27 AM    Specimen: Arm, Right; Blood   Result Value Ref Range    RBC Morphology Normal Normal    Dohle Bodies Present None Seen    Platelet Morphology Normal Normal   Magnesium    Collection Time: 03/28/25 12:27 AM    Specimen: Arm, Right; Blood   Result Value Ref Range    Magnesium 1.8 1.6 - 2.6 mg/dL   C-reactive Protein    Collection Time: 03/28/25 12:27 AM    Specimen: Arm, Right; Blood   Result Value Ref Range    C-Reactive Protein 22.51 (H) 0.00 - 0.50 mg/dL   Green Top (Gel)    Collection Time: 03/28/25 12:27 AM   Result Value Ref Range    Extra Tube Hold for add-ons.    Lavender Top    Collection Time: 03/28/25 12:27 AM   Result Value Ref Range    Extra Tube hold for add-on    Light Blue Top    Collection Time: 03/28/25 12:27 AM   Result Value Ref Range    Extra Tube Hold for add-ons.    Urinalysis With Culture If Indicated - Urine, Clean Catch    Collection Time: 03/28/25  1:21 AM    Specimen: Urine, Clean Catch   Result Value Ref Range    Color, UA Yellow Yellow, Straw    Appearance, UA Cloudy (A) Clear    pH, UA 6.0 5.0 - 8.0    Specific Gravity, UA 1.017 1.005 - 1.030    Glucose, UA Negative Negative    Ketones, UA Negative Negative    Bilirubin, UA Negative Negative    Blood, UA Trace (A) Negative    Protein,  mg/dL (2+) (A) Negative    Leuk Esterase, UA Moderate (2+) (A) Negative    Nitrite, UA Positive (A) Negative    Urobilinogen, UA 1.0 E.U./dL 0.2 - 1.0 E.U./dL   Urinalysis, Microscopic Only - Urine, Clean Catch    Collection Time: 03/28/25  1:21 AM    Specimen: Urine, Clean Catch   Result Value Ref Range    RBC, UA 0-2 None Seen, 0-2 /HPF    WBC, UA Too Numerous to Count (A) None Seen, 0-2 /HPF    Bacteria, UA 4+ (A) None Seen /HPF    Squamous Epithelial Cells, UA 0-2 None Seen, 0-2 /HPF    Hyaline Casts, UA 7-12 None Seen /LPF    Methodology Manual Light  Microscopy    Urine Drug Screen - Urine, Clean Catch    Collection Time: 03/28/25  1:21 AM    Specimen: Urine, Clean Catch   Result Value Ref Range    THC, Screen, Urine Negative Negative    Phencyclidine (PCP), Urine Negative Negative    Cocaine Screen, Urine Negative Negative    Methamphetamine, Ur Positive (A) Negative    Opiate Screen Negative Negative    Amphetamine Screen, Urine Positive (A) Negative    Benzodiazepine Screen, Urine Negative Negative    Tricyclic Antidepressants Screen Negative Negative    Methadone Screen, Urine Negative Negative    Barbiturates Screen, Urine Negative Negative    Oxycodone Screen, Urine Negative Negative    Buprenorphine, Screen, Urine Positive (A) Negative   Fentanyl, Urine - Urine, Clean Catch    Collection Time: 03/28/25  1:21 AM    Specimen: Urine, Clean Catch   Result Value Ref Range    Fentanyl, Urine Negative Negative   Lactic Acid, Plasma    Collection Time: 03/28/25  4:59 AM    Specimen: Arm, Right; Blood   Result Value Ref Range    Lactate 0.8 0.5 - 2.0 mmol/L     US Abdomen Limited  Result Date: 3/28/2025  1. Slight enlargement of the right kidney. This may be developmental. However, the findings are not entirely specific. No mass is appreciated. No hydronephrosis is identified either. 2. Slightly contracted but otherwise normal-appearing gallbladder.  This report was finalized on 3/28/2025 1:42 AM by Laureano Armstrong MD.      CT Abdomen Pelvis Without Contrast  Result Date: 3/28/2025  1. Asymmetric enlargement of the right kidney as above. There is also questionable loss of the normal fat within the renal hilum. This may be developmental in nature. However, an infectious process such as pyelonephritis could result in a similar appearance. Recommend close correlation with symptoms and urinalysis results. 2. No bowel obstruction or perforation.  This report was finalized on 3/28/2025 1:38 AM by Laureano Armstrong MD.        Procedures           ED Course  ED Course as of  03/28/25 0521   Fri Mar 28, 2025   0019 We are unable to establish peripheral IV access.  Difficulty with phlebotomy as well.  Patient reports a long history of IV drug use.  I have changed her medications to IM/p.o.  We have requested respiratory to draw her blood arterially. [CM]   0139 Case discussed with and care endorsed to HEVER Hilliard, pending radiological studies. [CM]   0352 Paged hospitalist for admission [KH]   1710 Spoke with Dr. Mayfield who accepts the patient to the hospitalist team. [KH]      ED Course User Index  [CM] Ke Kearney MD  [KH] Lisa Jones, HEVER                                                       Medical Decision Making  Problems Addressed:  Acute pyelonephritis: complicated acute illness or injury    Amount and/or Complexity of Data Reviewed  Labs: ordered.  Radiology: ordered.    Risk  Prescription drug management.  Decision regarding hospitalization.        Final diagnoses:   Acute pyelonephritis       ED Disposition  ED Disposition       ED Disposition   Decision to Admit    Condition   --    Comment   Level of Care: Med/Surg [1]   Diagnosis: Pyelonephritis [447051]   Admitting Physician: MAGED MAYFIELD [[id number]]   Attending Physician: MAGED MAYFIELD [[id number]]   Certification: I Certify That Inpatient Hospital Services Are Medically Necessary For Greater Than 2 Midnights                 No follow-up provider specified.       Medication List        New Prescriptions      praziquantel 600 MG tablet  Commonly known as: BILTRICIDE  Take 1 tablet by mouth 1 time for 1 dose.               Where to Get Your Medications        These medications were sent to [facility] [address] [phone] [facility] [phone] [address] [address]      Phone: [phone]   praziquantel 600 MG tablet            Lisa Jones APRN  03/28/25 0521

## 2025-03-29 ENCOUNTER — APPOINTMENT (OUTPATIENT)
Dept: ULTRASOUND IMAGING | Facility: HOSPITAL | Age: 35
DRG: 690 | End: 2025-03-29
Payer: MEDICAID

## 2025-03-29 LAB
ALBUMIN SERPL-MCNC: 2.1 G/DL (ref 3.5–5.2)
ALBUMIN/GLOB SERPL: 0.6 G/DL
ALP SERPL-CCNC: 175 U/L (ref 39–117)
ALT SERPL W P-5'-P-CCNC: 20 U/L (ref 1–33)
ANION GAP SERPL CALCULATED.3IONS-SCNC: 7.4 MMOL/L (ref 5–15)
AST SERPL-CCNC: 33 U/L (ref 1–32)
BACTERIA SPEC AEROBE CULT: NORMAL
BASOPHILS # BLD AUTO: 0.02 10*3/MM3 (ref 0–0.2)
BASOPHILS NFR BLD AUTO: 0.2 % (ref 0–1.5)
BILIRUB SERPL-MCNC: 0.2 MG/DL (ref 0–1.2)
BUN SERPL-MCNC: 19 MG/DL (ref 6–20)
BUN/CREAT SERPL: 11.5 (ref 7–25)
CALCIUM SPEC-SCNC: 8.4 MG/DL (ref 8.6–10.5)
CHLORIDE SERPL-SCNC: 106 MMOL/L (ref 98–107)
CO2 SERPL-SCNC: 22.6 MMOL/L (ref 22–29)
CREAT SERPL-MCNC: 1.65 MG/DL (ref 0.57–1)
CRP SERPL-MCNC: 17.15 MG/DL (ref 0–0.5)
DEPRECATED RDW RBC AUTO: 50.7 FL (ref 37–54)
EGFRCR SERPLBLD CKD-EPI 2021: 41.1 ML/MIN/1.73
EOSINOPHIL # BLD AUTO: 0.09 10*3/MM3 (ref 0–0.4)
EOSINOPHIL NFR BLD AUTO: 0.8 % (ref 0.3–6.2)
ERYTHROCYTE [DISTWIDTH] IN BLOOD BY AUTOMATED COUNT: 15.7 % (ref 12.3–15.4)
GLOBULIN UR ELPH-MCNC: 3.4 GM/DL
GLUCOSE SERPL-MCNC: 89 MG/DL (ref 65–99)
HCT VFR BLD AUTO: 29.7 % (ref 34–46.6)
HGB BLD-MCNC: 9.2 G/DL (ref 12–15.9)
IMM GRANULOCYTES # BLD AUTO: 0.15 10*3/MM3 (ref 0–0.05)
IMM GRANULOCYTES NFR BLD AUTO: 1.4 % (ref 0–0.5)
LYMPHOCYTES # BLD AUTO: 1.36 10*3/MM3 (ref 0.7–3.1)
LYMPHOCYTES NFR BLD AUTO: 12.8 % (ref 19.6–45.3)
MAGNESIUM SERPL-MCNC: 1.8 MG/DL (ref 1.6–2.6)
MCH RBC QN AUTO: 27.2 PG (ref 26.6–33)
MCHC RBC AUTO-ENTMCNC: 31 G/DL (ref 31.5–35.7)
MCV RBC AUTO: 87.9 FL (ref 79–97)
MONOCYTES # BLD AUTO: 1.48 10*3/MM3 (ref 0.1–0.9)
MONOCYTES NFR BLD AUTO: 13.9 % (ref 5–12)
NEUTROPHILS NFR BLD AUTO: 7.56 10*3/MM3 (ref 1.7–7)
NEUTROPHILS NFR BLD AUTO: 70.9 % (ref 42.7–76)
NRBC BLD AUTO-RTO: 0 /100 WBC (ref 0–0.2)
PLATELET # BLD AUTO: 199 10*3/MM3 (ref 140–450)
PMV BLD AUTO: 10.7 FL (ref 6–12)
POTASSIUM SERPL-SCNC: 4 MMOL/L (ref 3.5–5.2)
PROT SERPL-MCNC: 5.5 G/DL (ref 6–8.5)
RBC # BLD AUTO: 3.38 10*6/MM3 (ref 3.77–5.28)
SODIUM SERPL-SCNC: 136 MMOL/L (ref 136–145)
WBC NRBC COR # BLD AUTO: 10.66 10*3/MM3 (ref 3.4–10.8)

## 2025-03-29 PROCEDURE — 99232 SBSQ HOSP IP/OBS MODERATE 35: CPT | Performed by: INTERNAL MEDICINE

## 2025-03-29 PROCEDURE — 25010000002 CEFTRIAXONE PER 250 MG: Performed by: NURSE PRACTITIONER

## 2025-03-29 PROCEDURE — 25810000003 LACTATED RINGERS PER 1000 ML: Performed by: INTERNAL MEDICINE

## 2025-03-29 PROCEDURE — 25010000002 GENTAMICIN PER 80 MG: Performed by: NURSE PRACTITIONER

## 2025-03-29 PROCEDURE — 83735 ASSAY OF MAGNESIUM: CPT | Performed by: INTERNAL MEDICINE

## 2025-03-29 PROCEDURE — 99233 SBSQ HOSP IP/OBS HIGH 50: CPT | Performed by: NURSE PRACTITIONER

## 2025-03-29 PROCEDURE — 96361 HYDRATE IV INFUSION ADD-ON: CPT

## 2025-03-29 PROCEDURE — 25010000002 HEPARIN (PORCINE) PER 1000 UNITS: Performed by: HOSPITALIST

## 2025-03-29 PROCEDURE — 80053 COMPREHEN METABOLIC PANEL: CPT | Performed by: INTERNAL MEDICINE

## 2025-03-29 PROCEDURE — 86140 C-REACTIVE PROTEIN: CPT | Performed by: NURSE PRACTITIONER

## 2025-03-29 PROCEDURE — 96372 THER/PROPH/DIAG INJ SC/IM: CPT

## 2025-03-29 PROCEDURE — 85025 COMPLETE CBC W/AUTO DIFF WBC: CPT | Performed by: INTERNAL MEDICINE

## 2025-03-29 PROCEDURE — 87209 SMEAR COMPLEX STAIN: CPT

## 2025-03-29 PROCEDURE — 87177 OVA AND PARASITES SMEARS: CPT

## 2025-03-29 RX ORDER — LACTULOSE 10 G/15ML
30 SOLUTION ORAL ONCE
Status: COMPLETED | OUTPATIENT
Start: 2025-03-29 | End: 2025-03-29

## 2025-03-29 RX ORDER — POLYETHYLENE GLYCOL 3350 17 G/17G
17 POWDER, FOR SOLUTION ORAL DAILY
Status: DISCONTINUED | OUTPATIENT
Start: 2025-03-29 | End: 2025-03-30 | Stop reason: HOSPADM

## 2025-03-29 RX ORDER — DOCUSATE SODIUM 100 MG/1
100 CAPSULE, LIQUID FILLED ORAL 2 TIMES DAILY
Status: DISCONTINUED | OUTPATIENT
Start: 2025-03-29 | End: 2025-03-30 | Stop reason: HOSPADM

## 2025-03-29 RX ADMIN — SODIUM CHLORIDE, POTASSIUM CHLORIDE, SODIUM LACTATE AND CALCIUM CHLORIDE 100 ML/HR: 600; 310; 30; 20 INJECTION, SOLUTION INTRAVENOUS at 07:23

## 2025-03-29 RX ADMIN — NALOXEGOL OXALATE 25 MG: 25 TABLET, FILM COATED ORAL at 12:34

## 2025-03-29 RX ADMIN — BUPROPION HYDROCHLORIDE 150 MG: 150 TABLET, EXTENDED RELEASE ORAL at 09:29

## 2025-03-29 RX ADMIN — OXYCODONE HYDROCHLORIDE AND ACETAMINOPHEN 500 MG: 500 TABLET ORAL at 09:30

## 2025-03-29 RX ADMIN — LEVOTHYROXINE SODIUM 50 MCG: 0.05 TABLET ORAL at 09:33

## 2025-03-29 RX ADMIN — HEPARIN SODIUM 5000 UNITS: 5000 INJECTION INTRAVENOUS; SUBCUTANEOUS at 09:28

## 2025-03-29 RX ADMIN — Medication 200 MG: at 09:29

## 2025-03-29 RX ADMIN — LACTULOSE 30 G: 20 SOLUTION ORAL at 12:33

## 2025-03-29 RX ADMIN — DOCUSATE SODIUM 100 MG: 100 CAPSULE, LIQUID FILLED ORAL at 12:34

## 2025-03-29 RX ADMIN — HEPARIN SODIUM 5000 UNITS: 5000 INJECTION INTRAVENOUS; SUBCUTANEOUS at 20:26

## 2025-03-29 RX ADMIN — GENTAMICIN SULFATE 300 MG: 40 INJECTION, SOLUTION INTRAMUSCULAR; INTRAVENOUS at 12:04

## 2025-03-29 RX ADMIN — CEFTRIAXONE 2000 MG: 2 INJECTION, POWDER, FOR SOLUTION INTRAMUSCULAR; INTRAVENOUS at 11:19

## 2025-03-29 RX ADMIN — Medication 500 MCG: at 09:29

## 2025-03-29 RX ADMIN — Medication 100 MG: at 09:29

## 2025-03-29 RX ADMIN — POLYETHYLENE GLYCOL (3350) 17 G: 17 POWDER, FOR SOLUTION ORAL at 12:33

## 2025-03-29 RX ADMIN — PANTOPRAZOLE SODIUM 40 MG: 40 TABLET, DELAYED RELEASE ORAL at 09:28

## 2025-03-29 RX ADMIN — ACETAMINOPHEN 650 MG: 325 TABLET, FILM COATED ORAL at 17:32

## 2025-03-29 RX ADMIN — Medication 1000 UNITS: at 09:29

## 2025-03-29 RX ADMIN — ACETAMINOPHEN 650 MG: 325 TABLET, FILM COATED ORAL at 07:23

## 2025-03-29 RX ADMIN — FLUTICASONE PROPIONATE 2 SPRAY: 50 SPRAY, METERED NASAL at 09:28

## 2025-03-29 RX ADMIN — Medication 10 ML: at 20:30

## 2025-03-29 RX ADMIN — DOCUSATE SODIUM 100 MG: 100 CAPSULE, LIQUID FILLED ORAL at 20:26

## 2025-03-29 RX ADMIN — CALCIUM 500 MG: 500 TABLET ORAL at 09:29

## 2025-03-29 NOTE — PLAN OF CARE
Goal Outcome Evaluation:      Pt is resting in bed at this time. Pt has ambulated independently in room. Pt had complaint of pain, prn med given. Pt had enema this AM, very little success,  Ordered oral bowel meds, pt has had three Bms since oral meds taken. No acute changes noted at this time. Will continue to follow plan of care.

## 2025-03-29 NOTE — PROGRESS NOTES
PROGRESS NOTE         Patient Identification:  Name:  Marielena Martínez  Age:  35 y.o.  Sex:  female  :  1990  MRN:  [id number]  Visit Number:  [id number]  Primary Care Provider:  Max Durand PA-C         LOS: 1 day       ----------------------------------------------------------------------------------------------------------------------  Subjective       Chief Complaints:    Acute pyelonephritis      Interval History:      Patient sitting up in bed this morning.  Reports improved right CVA tenderness this morning.  Denies nausea, vomiting, diarrhea.  Denies dysuria, urgency, frequency.  Tmax 99.2.  Currently on room air with no apparent distress.  Lungs clear to auscultation bilaterally.  Blood cultures from 3/28/2025 show no growth thus far.  Urine culture remains in process.    Review of Systems:    Constitutional: no fever, chills and night sweats.  Generalized fatigue.  Eyes: no eye drainage, itching or redness.  HEENT: no mouth sores, dysphagia or nose bleed.  Respiratory: no for shortness of breath, cough or production of sputum.  Cardiovascular: no chest pain, no palpitations, no orthopnea.  Gastrointestinal: no nausea, vomiting or diarrhea. No abdominal pain, hematemesis or rectal bleeding.  Genitourinary: no dysuria or polyuria.  Hematologic/lymphatic: no lymph node abnormalities, no easy bruising or easy bleeding.  Musculoskeletal: Right CVA tenderness, improving.  Skin: No rash and no itching.  Neurological: no loss of consciousness, no seizure, no headache.  Behavioral/Psych: no depression or suicidal ideation.  Endocrine: no hot flashes.  Immunologic: negative.    ----------------------------------------------------------------------------------------------------------------------      Objective       [facility] Meds:  ascorbic acid, 500 mg, Oral, Daily  buprenorphine-naloxone, 1 tablet, Sublingual, Daily  buPROPion XL, 150 mg, Oral, Daily  calcium carbonate (oyster  shell), 500 mg, Oral, Daily  cefTRIAXone, 2,000 mg, Intravenous, Q24H  cholecalciferol, 1,000 Units, Oral, Daily  fluticasone, 2 spray, Nasal, Daily  gentamicin, 5 mg/kg (Adjusted), Intravenous, Once  heparin (porcine), 5,000 Units, Subcutaneous, Q12H  levothyroxine, 50 mcg, Oral, QAM AC  magnesium oxide, 200 mg, Oral, Daily  pantoprazole, 40 mg, Oral, QAM AC  sodium chloride, 10 mL, Intravenous, Q12H  thiamine, 100 mg, Oral, Daily  vitamin B-12, 500 mcg, Oral, Daily  vitamin B-6, 100 mg, Oral, Daily         ----------------------------------------------------------------------------------------------------------------------    Vital Signs:  Temp:  [97.7 °F (36.5 °C)-99.2 °F (37.3 °C)] 99.2 °F (37.3 °C)  Heart Rate:  [] 119  Resp:  [16-18] 18  BP: ()/(57-76) 112/72  Mean Arterial Pressure (Non-Invasive) for the past 24 hrs (Last 3 readings):   Noninvasive MAP (mmHg)   03/28/25 1417 75     SpO2 Percentage    03/28/25 1551 03/29/25 0200 03/29/25 0600   SpO2: 97% 98% 97%     SpO2:  [96 %-98 %] 97 %  on   ;   Device (Oxygen Therapy): room air    Body mass index is 28.08 kg/m².  Wt Readings from Last 3 Encounters:   03/29/25 74.2 kg (163 lb 9.6 oz)   11/12/17 65.8 kg (145 lb)   06/16/17 73.5 kg (162 lb)        Intake/Output Summary (Last 24 hours) at 3/29/2025 1055  Last data filed at 3/29/2025 0800  Gross per 24 hour   Intake 4381.83 ml   Output 600 ml   Net 3781.83 ml     Diet: Regular/House; Fluid Consistency: Thin (IDDSI 0)  ----------------------------------------------------------------------------------------------------------------------      Physical Exam:    Constitutional:  Well-developed and well-nourished.  No respiratory distress.  On room air.     HENT:  Head: Normocephalic and atraumatic.  Mouth:  Moist mucous membranes.    Eyes:  Conjunctivae and EOM are normal.  No scleral icterus.  Neck:  Neck supple.  No JVD present.    Cardiovascular:  Normal rate, regular rhythm and normal heart sounds  "with no murmur. No edema.  Pulmonary/Chest:  No respiratory distress, no wheezes, no crackles, with normal breath sounds and good air movement.  Abdominal:  Soft.  Bowel sounds are normal.  No distension and no tenderness.  Right CVA tenderness, improving  Musculoskeletal:  No edema, no tenderness, and no deformity.  No swelling or redness of joints.  Neurological:  Alert and oriented to person, place, and time.  No facial droop.  No slurred speech.   Skin:  Skin is warm and dry.  No rash noted.  No pallor.   Psychiatric:  Normal mood and affect.  Behavior is normal.        ----------------------------------------------------------------------------------------------------------------------            Results from last 7 days   Lab Units 03/28/25  0736 03/28/25  0459 03/28/25  0027   CRP mg/dL 17.83*  --  22.51*   LACTATE mmol/L  --  0.8  --    WBC 10*3/mm3 9.25  --  5.27   HEMOGLOBIN g/dL 9.7*  --  15.4   HEMATOCRIT % 30.7*  --  46.9*   MCV fL 86.7  --  82.4   MCHC g/dL 31.6  --  32.8   PLATELETS 10*3/mm3 185  --  127*     Results from last 7 days   Lab Units 03/28/25  0736 03/28/25  0027   SODIUM mmol/L 138 139   POTASSIUM mmol/L 3.4* 2.8*   MAGNESIUM mg/dL  --  1.8   CHLORIDE mmol/L 109* 105   CO2 mmol/L 16.8* 22.1   BUN mg/dL 19 20   CREATININE mg/dL 1.36* 1.46*   CALCIUM mg/dL 7.9* 8.6   GLUCOSE mg/dL 138* 120*   ALBUMIN g/dL 2.0* 2.7*   BILIRUBIN mg/dL 0.3 0.3   ALK PHOS U/L 131* 166*   AST (SGOT) U/L 20 19   ALT (SGPT) U/L 17 18   Estimated Creatinine Clearance: 56.4 mL/min (A) (by C-G formula based on SCr of 1.36 mg/dL (H)).  No results found for: \"AMMONIA\"    No results found for: \"HGBA1C\", \"POCGLU\"  No results found for: \"HGBA1C\"  Lab Results   Component Value Date    TSH 1.592 08/25/2015       Blood Culture   Date Value Ref Range Status   03/28/2025 No growth at 24 hours  Preliminary   03/28/2025 No growth at 24 hours  Preliminary     No results found for: \"URINECX\"  No results found for: \"WOUNDCX\"  No " "results found for: \"STOOLCX\"  No results found for: \"RESPCX\"  Pain Management Panel  More data exists         Latest Ref Rng & Units 3/28/2025 11/12/2017   Pain Management Panel   Amphetamine, Urine Qual Negative Positive  Negative    Barbiturates Screen, Urine Negative Negative  Positive    Benzodiazepine Screen, Urine Negative Negative  Negative    Buprenorphine, Screen, Urine Negative Positive  Negative    Cocaine Screen, Urine Negative Negative  Negative    Fentanyl, Urine Negative Negative  -   Methadone Screen , Urine Negative Negative  Negative    Methamphetamine, Ur Negative Positive  -         ----------------------------------------------------------------------------------------------------------------------  Imaging Results (Last 24 Hours)       ** No results found for the last 24 hours. **            ----------------------------------------------------------------------------------------------------------------------    Pertinent Infectious Disease Results                Assessment/Plan       Assessment     Acute pyelonephritis    Plan      Patient sitting up in bed this morning.  Reports improved right CVA tenderness this morning.  Denies nausea, vomiting, diarrhea.  Denies dysuria, urgency, frequency.  Tmax 99.2.  Currently on room air with no apparent distress.  Lungs clear to auscultation bilaterally.  Blood cultures from 3/28/2025 show no growth thus far.  Urine culture remains in process.    For now we will continue ceftriaxone 2 g IV every 24 hours pending finalization of urine culture results.  Repeat dose of gentamicin 5 mg/kg IV ordered for today.  Will continue to follow closely and adjust antibiotic therapy as needed.      RECENT ANTIMICROBIAL THERAPY    cefTRIAXone (ROCEPHIN) 2000 mg IVPB in 100 mL NS (VTB)  gentamicin IVPB in 100 mL (extended interval dosing)     Code Status:   Code Status and Medical Interventions: CPR (Attempt to Resuscitate); Full Support   Ordered at: 03/28/25 0430     " Code Status (Patient has no pulse and is not breathing):    CPR (Attempt to Resuscitate)     Medical Interventions (Patient has pulse or is breathing):    Full Support       HEVER Wilson  03/29/25  10:55 EDT

## 2025-03-29 NOTE — PLAN OF CARE
Goal Outcome Evaluation:      Pt resting in bed at this time. VSS. Voices no concerns at this time. LR going at 100. Has ambulated to toilet. Will continue plan of care.

## 2025-03-30 ENCOUNTER — APPOINTMENT (OUTPATIENT)
Dept: ULTRASOUND IMAGING | Facility: HOSPITAL | Age: 35
DRG: 690 | End: 2025-03-30
Payer: MEDICAID

## 2025-03-30 VITALS
SYSTOLIC BLOOD PRESSURE: 141 MMHG | HEART RATE: 84 BPM | BODY MASS INDEX: 27.57 KG/M2 | HEIGHT: 64 IN | OXYGEN SATURATION: 95 % | WEIGHT: 161.5 LBS | DIASTOLIC BLOOD PRESSURE: 61 MMHG | RESPIRATION RATE: 20 BRPM | TEMPERATURE: 97.7 F

## 2025-03-30 LAB
ALBUMIN SERPL-MCNC: 1.9 G/DL (ref 3.5–5.2)
ALBUMIN/GLOB SERPL: 0.5 G/DL
ALP SERPL-CCNC: 126 U/L (ref 39–117)
ALT SERPL W P-5'-P-CCNC: 17 U/L (ref 1–33)
ANION GAP SERPL CALCULATED.3IONS-SCNC: 6.8 MMOL/L (ref 5–15)
AST SERPL-CCNC: 21 U/L (ref 1–32)
BACTERIA UR QL AUTO: ABNORMAL /HPF
BASOPHILS # BLD AUTO: 0.03 10*3/MM3 (ref 0–0.2)
BASOPHILS NFR BLD AUTO: 0.3 % (ref 0–1.5)
BILIRUB SERPL-MCNC: 0.2 MG/DL (ref 0–1.2)
BILIRUB UR QL STRIP: NEGATIVE
BUN SERPL-MCNC: 19 MG/DL (ref 6–20)
BUN/CREAT SERPL: 12.2 (ref 7–25)
CALCIUM SPEC-SCNC: 8.5 MG/DL (ref 8.6–10.5)
CHLORIDE SERPL-SCNC: 110 MMOL/L (ref 98–107)
CLARITY UR: CLEAR
CO2 SERPL-SCNC: 21.2 MMOL/L (ref 22–29)
COLOR UR: YELLOW
CREAT SERPL-MCNC: 1.56 MG/DL (ref 0.57–1)
DEPRECATED RDW RBC AUTO: 50.7 FL (ref 37–54)
EGFRCR SERPLBLD CKD-EPI 2021: 44 ML/MIN/1.73
EOSINOPHIL # BLD AUTO: 0.06 10*3/MM3 (ref 0–0.4)
EOSINOPHIL NFR BLD AUTO: 0.6 % (ref 0.3–6.2)
ERYTHROCYTE [DISTWIDTH] IN BLOOD BY AUTOMATED COUNT: 15.8 % (ref 12.3–15.4)
GLOBULIN UR ELPH-MCNC: 3.6 GM/DL
GLUCOSE SERPL-MCNC: 106 MG/DL (ref 65–99)
GLUCOSE UR STRIP-MCNC: NEGATIVE MG/DL
HCT VFR BLD AUTO: 28.3 % (ref 34–46.6)
HGB BLD-MCNC: 8.7 G/DL (ref 12–15.9)
HGB UR QL STRIP.AUTO: ABNORMAL
HYALINE CASTS UR QL AUTO: ABNORMAL /LPF
IMM GRANULOCYTES # BLD AUTO: 0.18 10*3/MM3 (ref 0–0.05)
IMM GRANULOCYTES NFR BLD AUTO: 1.9 % (ref 0–0.5)
KETONES UR QL STRIP: NEGATIVE
LEUKOCYTE ESTERASE UR QL STRIP.AUTO: ABNORMAL
LYMPHOCYTES # BLD AUTO: 1.1 10*3/MM3 (ref 0.7–3.1)
LYMPHOCYTES NFR BLD AUTO: 11.3 % (ref 19.6–45.3)
MCH RBC QN AUTO: 27 PG (ref 26.6–33)
MCHC RBC AUTO-ENTMCNC: 30.7 G/DL (ref 31.5–35.7)
MCV RBC AUTO: 87.9 FL (ref 79–97)
MONOCYTES # BLD AUTO: 1.35 10*3/MM3 (ref 0.1–0.9)
MONOCYTES NFR BLD AUTO: 13.9 % (ref 5–12)
NEUTROPHILS NFR BLD AUTO: 6.99 10*3/MM3 (ref 1.7–7)
NEUTROPHILS NFR BLD AUTO: 72 % (ref 42.7–76)
NITRITE UR QL STRIP: NEGATIVE
NRBC BLD AUTO-RTO: 0 /100 WBC (ref 0–0.2)
PH UR STRIP.AUTO: 7 [PH] (ref 5–8)
PLATELET # BLD AUTO: 218 10*3/MM3 (ref 140–450)
PMV BLD AUTO: 10.7 FL (ref 6–12)
POTASSIUM SERPL-SCNC: 3.8 MMOL/L (ref 3.5–5.2)
PROT SERPL-MCNC: 5.5 G/DL (ref 6–8.5)
PROT UR QL STRIP: ABNORMAL
RBC # BLD AUTO: 3.22 10*6/MM3 (ref 3.77–5.28)
RBC # UR STRIP: ABNORMAL /HPF
REF LAB TEST METHOD: ABNORMAL
SODIUM SERPL-SCNC: 138 MMOL/L (ref 136–145)
SP GR UR STRIP: 1.01 (ref 1–1.03)
SQUAMOUS #/AREA URNS HPF: ABNORMAL /HPF
UROBILINOGEN UR QL STRIP: ABNORMAL
WBC # UR STRIP: ABNORMAL /HPF
WBC NRBC COR # BLD AUTO: 9.71 10*3/MM3 (ref 3.4–10.8)

## 2025-03-30 PROCEDURE — 76775 US EXAM ABDO BACK WALL LIM: CPT

## 2025-03-30 PROCEDURE — 76775 US EXAM ABDO BACK WALL LIM: CPT | Performed by: RADIOLOGY

## 2025-03-30 PROCEDURE — 96372 THER/PROPH/DIAG INJ SC/IM: CPT

## 2025-03-30 PROCEDURE — 81001 URINALYSIS AUTO W/SCOPE: CPT | Performed by: NURSE PRACTITIONER

## 2025-03-30 PROCEDURE — 25010000002 HEPARIN (PORCINE) PER 1000 UNITS: Performed by: HOSPITALIST

## 2025-03-30 PROCEDURE — 80053 COMPREHEN METABOLIC PANEL: CPT | Performed by: INTERNAL MEDICINE

## 2025-03-30 PROCEDURE — 87086 URINE CULTURE/COLONY COUNT: CPT | Performed by: NURSE PRACTITIONER

## 2025-03-30 PROCEDURE — 99232 SBSQ HOSP IP/OBS MODERATE 35: CPT | Performed by: NURSE PRACTITIONER

## 2025-03-30 PROCEDURE — 99239 HOSP IP/OBS DSCHRG MGMT >30: CPT | Performed by: INTERNAL MEDICINE

## 2025-03-30 PROCEDURE — 85025 COMPLETE CBC W/AUTO DIFF WBC: CPT | Performed by: INTERNAL MEDICINE

## 2025-03-30 RX ORDER — CEFDINIR 300 MG/1
300 CAPSULE ORAL EVERY 12 HOURS SCHEDULED
Status: DISCONTINUED | OUTPATIENT
Start: 2025-03-30 | End: 2025-03-30 | Stop reason: HOSPADM

## 2025-03-30 RX ORDER — POLYETHYLENE GLYCOL 3350 17 G/17G
17 POWDER, FOR SOLUTION ORAL DAILY
Qty: 30 PACKET | Refills: 0 | Status: SHIPPED | OUTPATIENT
Start: 2025-03-31

## 2025-03-30 RX ORDER — CEFDINIR 300 MG/1
300 CAPSULE ORAL EVERY 12 HOURS SCHEDULED
Qty: 13 CAPSULE | Refills: 0 | Status: SHIPPED | OUTPATIENT
Start: 2025-03-30 | End: 2025-04-06

## 2025-03-30 RX ORDER — PSEUDOEPHEDRINE HCL 30 MG
100 TABLET ORAL 2 TIMES DAILY
Qty: 60 CAPSULE | Refills: 0 | Status: SHIPPED | OUTPATIENT
Start: 2025-03-30

## 2025-03-30 RX ADMIN — CEFDINIR 300 MG: 300 CAPSULE ORAL at 17:04

## 2025-03-30 RX ADMIN — Medication 500 MCG: at 08:33

## 2025-03-30 RX ADMIN — LEVOTHYROXINE SODIUM 50 MCG: 0.05 TABLET ORAL at 08:34

## 2025-03-30 RX ADMIN — CALCIUM 500 MG: 500 TABLET ORAL at 08:34

## 2025-03-30 RX ADMIN — CEFDINIR 300 MG: 300 CAPSULE ORAL at 11:26

## 2025-03-30 RX ADMIN — BUPROPION HYDROCHLORIDE 150 MG: 150 TABLET, EXTENDED RELEASE ORAL at 08:34

## 2025-03-30 RX ADMIN — PANTOPRAZOLE SODIUM 40 MG: 40 TABLET, DELAYED RELEASE ORAL at 08:33

## 2025-03-30 RX ADMIN — Medication 100 MG: at 08:34

## 2025-03-30 RX ADMIN — Medication 1000 UNITS: at 08:34

## 2025-03-30 RX ADMIN — Medication 10 ML: at 08:35

## 2025-03-30 RX ADMIN — ACETAMINOPHEN 650 MG: 325 TABLET, FILM COATED ORAL at 08:41

## 2025-03-30 RX ADMIN — OXYCODONE HYDROCHLORIDE AND ACETAMINOPHEN 500 MG: 500 TABLET ORAL at 08:34

## 2025-03-30 RX ADMIN — POLYETHYLENE GLYCOL (3350) 17 G: 17 POWDER, FOR SOLUTION ORAL at 08:34

## 2025-03-30 RX ADMIN — FLUTICASONE PROPIONATE 2 SPRAY: 50 SPRAY, METERED NASAL at 08:34

## 2025-03-30 RX ADMIN — NALOXEGOL OXALATE 25 MG: 25 TABLET, FILM COATED ORAL at 06:22

## 2025-03-30 RX ADMIN — Medication 200 MG: at 08:33

## 2025-03-30 RX ADMIN — Medication 100 MG: at 08:33

## 2025-03-30 RX ADMIN — IBUPROFEN 600 MG: 400 TABLET, FILM COATED ORAL at 04:58

## 2025-03-30 RX ADMIN — DOCUSATE SODIUM 100 MG: 100 CAPSULE, LIQUID FILLED ORAL at 08:33

## 2025-03-30 RX ADMIN — HEPARIN SODIUM 5000 UNITS: 5000 INJECTION INTRAVENOUS; SUBCUTANEOUS at 08:34

## 2025-03-30 RX ADMIN — DOCUSATE SODIUM 100 MG: 100 CAPSULE, LIQUID FILLED ORAL at 17:04

## 2025-03-30 NOTE — PROGRESS NOTES
[facility] HOSPITALIST PROGRESS NOTE    Subjective     History:   Marielena Martínez is a 35 y.o. female admitted on 3/27/2025 secondary to Pyelonephritis     Procedures: None    CC: Follow up UTI/pyelonephritis     Patient seen and examined with DELORES Goodwin. Awake and alert. Reports some improvement in right-sided flank pain. Nausea appears improved with no further episodes of vomiting reported. Reports constipation with generalized abdominal pain. No reported CP or dyspnea. No acute events reported.     History taken from: patient, chart, and RN.      Objective     Vital Signs  Temp:  [97.7 °F (36.5 °C)-99.2 °F (37.3 °C)] 97.8 °F (36.6 °C)  Heart Rate:  [] 102  Resp:  [16-18] 18  BP: (106-123)/(57-78) 123/78    Intake/Output Summary (Last 24 hours) at 3/29/2025 2126  Last data filed at 3/29/2025 1836  Gross per 24 hour   Intake 4365.99 ml   Output 2815 ml   Net 1550.99 ml         Physical Exam:  General:    Awake, alert, in no acute distress   Heart:      Normal S1 and S2. Regular rate and rhythm. No significant murmur, rubs or gallops appreciated.   Lungs:     Respirations regular, even and unlabored. Lungs clear to auscultation B/L. No wheezes, rales or rhonchi.   Abdomen:   Soft. Mild generalized TTP. No guarding, rebound tenderness or  organomegaly noted. Bowel sounds present x 4. (+) right CVA tenderness but improved.    Extremities:  No clubbing, cyanosis or edema noted. Moves UE and LE equally B/L.     Results Review:    Results from last 7 days   Lab Units 03/29/25  1410 03/28/25  0736 03/28/25  0027   WBC 10*3/mm3 10.66 9.25 5.27   HEMOGLOBIN g/dL 9.2* 9.7* 15.4   PLATELETS 10*3/mm3 199 185 127*     Results from last 7 days   Lab Units 03/29/25  1410 03/28/25  0736 03/28/25  0027   SODIUM mmol/L 136 138 139   POTASSIUM mmol/L 4.0 3.4* 2.8*   CHLORIDE mmol/L 106 109* 105   CO2 mmol/L 22.6 16.8* 22.1   BUN mg/dL 19 19 20   CREATININE mg/dL 1.65* 1.36* 1.46*   CALCIUM mg/dL 8.4*  7.9* 8.6   GLUCOSE mg/dL 89 138* 120*     Results from last 7 days   Lab Units 03/29/25  1410 03/28/25  0736 03/28/25  0027   BILIRUBIN mg/dL 0.2 0.3 0.3   ALK PHOS U/L 175* 131* 166*   AST (SGOT) U/L 33* 20 19   ALT (SGPT) U/L 20 17 18     Results from last 7 days   Lab Units 03/29/25  1410 03/28/25  0027   MAGNESIUM mg/dL 1.8 1.8               Imaging Results (Last 24 Hours)       ** No results found for the last 24 hours. **              Medications:  ascorbic acid, 500 mg, Oral, Daily  buprenorphine-naloxone, 1 tablet, Sublingual, Daily  buPROPion XL, 150 mg, Oral, Daily  calcium carbonate (oyster shell), 500 mg, Oral, Daily  cefTRIAXone, 2,000 mg, Intravenous, Q24H  cholecalciferol, 1,000 Units, Oral, Daily  docusate sodium, 100 mg, Oral, BID  fluticasone, 2 spray, Nasal, Daily  heparin (porcine), 5,000 Units, Subcutaneous, Q12H  levothyroxine, 50 mcg, Oral, QAM AC  magnesium oxide, 200 mg, Oral, Daily  Naloxegol Oxalate, 25 mg, Oral, QAM  pantoprazole, 40 mg, Oral, QAM AC  polyethylene glycol, 17 g, Oral, Daily  sodium chloride, 10 mL, Intravenous, Q12H  thiamine, 100 mg, Oral, Daily  vitamin B-12, 500 mcg, Oral, Daily  vitamin B-6, 100 mg, Oral, Daily                 Assessment & Plan   Acute UTI/right-sided pyelonephritis: Initially placed on Vanc and Azactam on admission with concern for cephalosporin allergy. Upon further discussion, reported allergy not a true allergy and her antibiotic regimen has been deescalated to high dose Rocephin with gentamicin per ID recs. Urine culture revealing mixed jonas. Follow cultures and repeat labs in the AM. ID input appreciated.     OMID: Treatment of UTI/pyelonephritis as above. No evidence of hydronephrosis on imaging. Cr has trended upward today. S/P IVF's. Monitor UOP and repeat labs in the AM.     Hypokalemia: K+ improved with supplementation. Mg is 1.8. Cont electrolyte replacement protocols.     Constipation: S/P enema. Start Movantik.     GERD: Cont  PPI.    Anxiety: Cont home medication regimen.     Hx of substance use disorder: Cont home Suboxone.     DVT PPX: SQ heparin    Disposition Likely home when medically stable.     Terrance Kauffman DO  03/29/25  21:26 EDT

## 2025-03-30 NOTE — PROGRESS NOTES
PROGRESS NOTE         Patient Identification:  Name:  Marielena Martínez  Age:  35 y.o.  Sex:  female  :  1990  MRN:  [id number]  Visit Number:  [id number]  Primary Care Provider:  Max Durand PA-C         LOS: 2 days       ----------------------------------------------------------------------------------------------------------------------  Subjective       Chief Complaints:    Acute pyelonephritis      Interval History:      Patient resting in bed this morning.  Overall feels better.  Improved right CVA tenderness.  Lungs clear to auscultation bilaterally.  On room air with no apparent distress.  WBC 10.66.  CRP stable at 17.15.  Urine culture finalizes greater than 100,000 colonies of mixed jonas.  Repeat urinalysis improved with 21-50 WBCs.    Review of Systems:    Constitutional: no fever, chills and night sweats.  Generalized fatigue.  Eyes: no eye drainage, itching or redness.  HEENT: no mouth sores, dysphagia or nose bleed.  Respiratory: no for shortness of breath, cough or production of sputum.  Cardiovascular: no chest pain, no palpitations, no orthopnea.  Gastrointestinal: no nausea, vomiting or diarrhea. No abdominal pain, hematemesis or rectal bleeding.  Genitourinary: no dysuria or polyuria.  Hematologic/lymphatic: no lymph node abnormalities, no easy bruising or easy bleeding.  Musculoskeletal: Right CVA tenderness, improving.  Skin: No rash and no itching.  Neurological: no loss of consciousness, no seizure, no headache.  Behavioral/Psych: no depression or suicidal ideation.  Endocrine: no hot flashes.  Immunologic: negative.    ----------------------------------------------------------------------------------------------------------------------      Objective       [facility] Meds:  ascorbic acid, 500 mg, Oral, Daily  buprenorphine-naloxone, 1 tablet, Sublingual, Daily  buPROPion XL, 150 mg, Oral, Daily  calcium carbonate (oyster shell), 500 mg, Oral,  Daily  cefdinir, 300 mg, Oral, Q12H  cholecalciferol, 1,000 Units, Oral, Daily  docusate sodium, 100 mg, Oral, BID  fluticasone, 2 spray, Nasal, Daily  heparin (porcine), 5,000 Units, Subcutaneous, Q12H  levothyroxine, 50 mcg, Oral, QAM AC  magnesium oxide, 200 mg, Oral, Daily  Naloxegol Oxalate, 25 mg, Oral, QAM  pantoprazole, 40 mg, Oral, QAM AC  polyethylene glycol, 17 g, Oral, Daily  sodium chloride, 10 mL, Intravenous, Q12H  thiamine, 100 mg, Oral, Daily  vitamin B-12, 500 mcg, Oral, Daily  vitamin B-6, 100 mg, Oral, Daily         ----------------------------------------------------------------------------------------------------------------------    Vital Signs:  Temp:  [97.8 °F (36.6 °C)-98.3 °F (36.8 °C)] 98 °F (36.7 °C)  Heart Rate:  [] 87  Resp:  [18-20] 20  BP: ()/(59-78) 97/59  Mean Arterial Pressure (Non-Invasive) for the past 24 hrs (Last 3 readings):   Noninvasive MAP (mmHg)   03/30/25 0306 94   03/29/25 1900 97     SpO2 Percentage    03/29/25 1400 03/29/25 1900 03/30/25 0306   SpO2: 97% 96% 95%     SpO2:  [95 %-97 %] 95 %  on   ;   Device (Oxygen Therapy): room air    Body mass index is 27.72 kg/m².  Wt Readings from Last 3 Encounters:   03/30/25 73.3 kg (161 lb 8 oz)   11/12/17 65.8 kg (145 lb)   06/16/17 73.5 kg (162 lb)        Intake/Output Summary (Last 24 hours) at 3/30/2025 1307  Last data filed at 3/30/2025 1100  Gross per 24 hour   Intake 3051 ml   Output 2825 ml   Net 226 ml     Diet: Regular/House; Fluid Consistency: Thin (IDDSI 0)  ----------------------------------------------------------------------------------------------------------------------      Physical Exam:    Constitutional:  Well-developed and well-nourished.  No respiratory distress.  On room air.     HENT:  Head: Normocephalic and atraumatic.  Mouth:  Moist mucous membranes.    Eyes:  Conjunctivae and EOM are normal.  No scleral icterus.  Neck:  Neck supple.  No JVD present.    Cardiovascular:  Normal rate,  "regular rhythm and normal heart sounds with no murmur. No edema.  Pulmonary/Chest:  No respiratory distress, no wheezes, no crackles, with normal breath sounds and good air movement.  Abdominal:  Soft.  Bowel sounds are normal.  No distension and no tenderness.  Right CVA tenderness, improving  Musculoskeletal:  No edema, no tenderness, and no deformity.  No swelling or redness of joints.  Neurological:  Alert and oriented to person, place, and time.  No facial droop.  No slurred speech.   Skin:  Skin is warm and dry.  No rash noted.  No pallor.   Psychiatric:  Normal mood and affect.  Behavior is normal.        ----------------------------------------------------------------------------------------------------------------------            Results from last 7 days   Lab Units 03/30/25  0901 03/29/25  1410 03/28/25  0736 03/28/25  0459 03/28/25  0027   CRP mg/dL  --  17.15* 17.83*  --  22.51*   LACTATE mmol/L  --   --   --  0.8  --    WBC 10*3/mm3 9.71 10.66 9.25  --  5.27   HEMOGLOBIN g/dL 8.7* 9.2* 9.7*  --  15.4   HEMATOCRIT % 28.3* 29.7* 30.7*  --  46.9*   MCV fL 87.9 87.9 86.7  --  82.4   MCHC g/dL 30.7* 31.0* 31.6  --  32.8   PLATELETS 10*3/mm3 218 199 185  --  127*     Results from last 7 days   Lab Units 03/30/25  0901 03/29/25  1410 03/28/25  0736 03/28/25  0027   SODIUM mmol/L 138 136 138 139   POTASSIUM mmol/L 3.8 4.0 3.4* 2.8*   MAGNESIUM mg/dL  --  1.8  --  1.8   CHLORIDE mmol/L 110* 106 109* 105   CO2 mmol/L 21.2* 22.6 16.8* 22.1   BUN mg/dL 19 19 19 20   CREATININE mg/dL 1.56* 1.65* 1.36* 1.46*   CALCIUM mg/dL 8.5* 8.4* 7.9* 8.6   GLUCOSE mg/dL 106* 89 138* 120*   ALBUMIN g/dL 1.9* 2.1* 2.0* 2.7*   BILIRUBIN mg/dL 0.2 0.2 0.3 0.3   ALK PHOS U/L 126* 175* 131* 166*   AST (SGOT) U/L 21 33* 20 19   ALT (SGPT) U/L 17 20 17 18   Estimated Creatinine Clearance: 48.9 mL/min (A) (by C-G formula based on SCr of 1.56 mg/dL (H)).  No results found for: \"AMMONIA\"    No results found for: \"HGBA1C\", \"POCGLU\"  No " "results found for: \"HGBA1C\"  Lab Results   Component Value Date    TSH 1.592 08/25/2015       Blood Culture   Date Value Ref Range Status   03/28/2025 No growth at 24 hours  Preliminary   03/28/2025 No growth at 24 hours  Preliminary     No results found for: \"URINECX\"  No results found for: \"WOUNDCX\"  No results found for: \"STOOLCX\"  No results found for: \"RESPCX\"  Pain Management Panel  More data exists         Latest Ref Rng & Units 3/28/2025 11/12/2017   Pain Management Panel   Amphetamine, Urine Qual Negative Positive  Negative    Barbiturates Screen, Urine Negative Negative  Positive    Benzodiazepine Screen, Urine Negative Negative  Negative    Buprenorphine, Screen, Urine Negative Positive  Negative    Cocaine Screen, Urine Negative Negative  Negative    Fentanyl, Urine Negative Negative  -   Methadone Screen , Urine Negative Negative  Negative    Methamphetamine, Ur Negative Positive  -         ----------------------------------------------------------------------------------------------------------------------  Imaging Results (Last 24 Hours)       ** No results found for the last 24 hours. **            ----------------------------------------------------------------------------------------------------------------------    Pertinent Infectious Disease Results                Assessment/Plan       Assessment     Acute pyelonephritis    Plan      Patient resting in bed this morning.  Overall feels better.  Improved right CVA tenderness.  Lungs clear to auscultation bilaterally.  On room air with no apparent distress.  WBC 10.66.  CRP stable at 17.15.  Urine culture finalizes greater than 100,000 colonies of mixed jonas.  Repeat urinalysis improved with 21-50 WBCs.    In the setting of overall clinical and laboratory improvement ceftriaxone was de-escalated to cefdinir 300 mg p.o. twice daily to continue through 4/6/2025.    RECENT ANTIMICROBIAL THERAPY    cefdinir - 300 MG     Code Status:   Code Status and " Medical Interventions: CPR (Attempt to Resuscitate); Full Support   Ordered at: 03/28/25 0430     Code Status (Patient has no pulse and is not breathing):    CPR (Attempt to Resuscitate)     Medical Interventions (Patient has pulse or is breathing):    Full Support       HEVER Wilson  03/30/25  13:07 EDT

## 2025-03-30 NOTE — PLAN OF CARE
Goal Outcome Evaluation:  Plan of Care Reviewed With: patient           Outcome Evaluation: Pt is resting at this time. VSS on RA. No acute change during shift. Pt has no complaints or concerns at this time. Continuing POC.

## 2025-03-30 NOTE — PLAN OF CARE
Goal Outcome Evaluation:   Pt resting in bed. Pt ambulated in room. No acute changes. VSS. Will continue plan of care.

## 2025-03-30 NOTE — DISCHARGE SUMMARY
[facility] DISCHARGE SUMMARY      Date of Admission: 3/27/2025    Date of Discharge: 3/30/2025     PCP: Max Durand PA-C    Admission Diagnosis:   Please see admission H&P    Discharge Diagnosis:   Acute UTI/right-sided pyelonephritis  OMID  Hypokalemia  Hypomagnesemia  Constipation  GERD  Anxiety  Hx of substance use disorder    Procedures Performed:  None     Consults:   Consults       Date and Time Order Name Status Description    3/28/2025  8:40 AM Inpatient Infectious Diseases Consult Completed               History of Present Illness:  Marielena Martínez is a 35 y.o. female who presented to [facility] ED with CC of abdominal pain. Please see admission H&P for complete details.    In the ED, workup revealed a UTI/right-sided pyelonephritis, hypokalemia and constipation. Cultures were obtained and IV antibiotics initiated.      Hospital Course  Marielena Martínez was admitted to the med/surg floor for further evaluation and treatment. She was initially placed on Azactam in the setting of reported cephalosporin allergy and started on maintenance IVF's. The hospital's electrolyte replacement protocols were initiated. ID was consulted for further input.     Upon further discussion, her reported allergy did not appear to be a true allergy and she was deescalated to high dose Rocephin with gentamicin given per ID recs as well. She was placed on a bowel regimen with improvement in her constipation. Her Cr initially trended upward but improved by the time of discharge. Renal US was obtained with no evidence of hydronephrosis. Cultures were followed with urine culture revealing mixed jonas. Over the course of a few days she clinically improved and was deescalated to Omnicef per ID recs. She was reevaluated on 3/30 and deemed medically stable for discharge. Instructions were given for outpatient follow up with her PCP.     Condition on Discharge:  Stable    Vital Signs  Vitals:    03/30/25 1429   BP:  141/61   Pulse: 84   Resp: 20   Temp: 97.7 °F (36.5 °C)   SpO2:        Physical Exam:  General:    Awake, alert, in no acute distress   Heart:      Normal S1 and S2. Regular rate and rhythm. No significant murmur, rubs or gallops appreciated.   Lungs:     Respirations regular, even and unlabored. Lungs clear to auscultation B/L. No wheezes, rales or rhonchi.   Abdomen:   Soft and nontender. No guarding, rebound tenderness or  organomegaly noted. Bowel sounds present x 4.   Extremities:  No clubbing, cyanosis or edema noted. Moves UE and LE equally B/L.     Discharge Disposition:   home      Discharge Medications:     Discharge Medications        New Medications        Instructions Start Date   cefdinir 300 MG capsule  Commonly known as: OMNICEF   300 mg, Oral, Every 12 Hours Scheduled      docusate sodium 100 MG capsule   100 mg, Oral, 2 Times Daily      polyethylene glycol 17 g packet  Commonly known as: MIRALAX   17 g, Oral, Daily   Start Date: March 31, 2025            Continue These Medications        Instructions Start Date   ascorbic acid 500 MG tablet  Commonly known as: VITAMIN C   500 mg, Oral, Daily      benzonatate 200 MG capsule  Commonly known as: TESSALON   200 mg, Oral, 3 Times Daily PRN      buprenorphine-naloxone 8-2 MG film film  Commonly known as: SUBOXONE   1 film, Sublingual, Daily      buPROPion  MG 24 hr tablet  Commonly known as: WELLBUTRIN XL   150 mg, Oral, Daily      calcium carbonate 600 MG tablet  Commonly known as: OS-KIANNA   600 mg, Oral, Daily      cholecalciferol 25 MCG (1000 UT) tablet  Commonly known as: VITAMIN D3   1,000 Units, Oral, Daily      esomeprazole 40 MG capsule  Commonly known as: nexIUM   40 mg, Oral, Every Morning Before Breakfast      fluticasone 50 MCG/ACT nasal spray  Commonly known as: FLONASE   2 sprays, Nasal, Daily      levothyroxine 50 MCG tablet  Commonly known as: SYNTHROID, LEVOTHROID   50 mcg, Oral, Every Early Morning      magnesium oxide 250 MG  tablet   250 mg, Oral, Daily      naloxone 4 MG/0.1ML nasal spray  Commonly known as: NARCAN   1 spray, Nasal, As Needed, Call 911. Don't prime. [place] in 1 nostril for overdose. Repeat in 2-3 minutes in other nostril if no or minimal breathing/responsiveness.      nicotine 21 MG/24HR patch  Commonly known as: NICODERM CQ   1 patch, Transdermal, Daily PRN      Omega 3 1000 MG capsule   1,000 mg, Oral, Daily      ondansetron ODT 4 MG disintegrating tablet  Commonly known as: ZOFRAN-ODT   4 mg, Translingual, Every 8 Hours PRN      thiamine 100 MG tablet  tablet  Commonly known as: VITAMIN B-1   100 mg, Oral, Daily      vitamin B-12 500 MCG tablet  Commonly known as: CYANOCOBALAMIN   500 mcg, Oral, Daily      Vitamin B6 100 MG tablet   100 mg, Oral, Daily             Stop These Medications      ibuprofen 600 MG tablet  Commonly known as: ADVILMOTRIN            ASK your doctor about these medications        Instructions Start Date   praziquantel 600 MG tablet  Commonly known as: BILTRICIDE  Ask about: Should I take this medication?   600 mg, Oral, Once                 Discharge Diet:   Dietary Orders (From admission, onward)       Start     Ordered    03/28/25 0854  Diet: Regular/House; Fluid Consistency: Thin (IDDSI 0)  Diet Effective Now        References:    Diet Order Definitions   Question Answer Comment   Diets: Regular/House    Fluid Consistency: Thin (IDDSI 0)        03/28/25 0853                    Activity at Discharge:  activity as tolerated    Follow-up Appointments:  Additional Instructions for the Follow-ups that You Need to Schedule       Discharge Follow-up with PCP   As directed       Currently Documented PCP:    Max Durand PA-C    PCP Phone Number:    [phone]     Follow Up Details: Follow up with PCP within 1 week.               Follow-up Information       Max Durand PA-C .    Specialty: Physician Assistant  Why: Follow up with PCP within 1 week.  Contact information:  [address]  [facility] [address]  [phone]               Charlie Schwab III, MD .    Specialties: Obstetrics and Gynecology, Gynecology  Why: Follow up with PCP within 1 week.  Contact information:  [address]  [address]  [place] [id number]  [phone]               Saray Gandara APRN .    Specialties: Infectious Diseases, Nurse Practitioner  Contact information:  [address]  [address]  [place] [id number]  [phone]                           Your Scheduled Appointments    Will call Monday with Follow-Up appointments             Test Results Pending at Discharge:  Pending Labs       Order Current Status    Ova & Parasite Examination - Stool, Per Rectum In process    Urine Culture - Urine, Urine, Clean Catch In process    Blood Culture - Blood, Arm, Left Preliminary result    Blood Culture - Blood, Arm, Right Preliminary result             The ASCVD Risk score (Lit SHAHID, et al., 2019) failed to calculate for the following reasons:    The 2019 ASCVD risk score is only valid for ages 40 to 79      Terrance Kauffman DO  03/30/25  16:33 EDT      Time: Greater than 30 minutes spent on this discharge.

## 2025-03-31 ENCOUNTER — TELEPHONE (OUTPATIENT)
Dept: MEDSURG UNIT | Facility: HOSPITAL | Age: 35
End: 2025-03-31
Payer: MEDICAID

## 2025-03-31 LAB — BACTERIA SPEC AEROBE CULT: NO GROWTH

## 2025-04-01 LAB
O+P SPEC MICRO: NORMAL
O+P STL CONC: NORMAL

## 2025-04-02 LAB
BACTERIA SPEC AEROBE CULT: NORMAL
BACTERIA SPEC AEROBE CULT: NORMAL

## 2025-04-11 ENCOUNTER — OFFICE VISIT (OUTPATIENT)
Dept: INFECTIOUS DISEASES | Facility: CLINIC | Age: 35
End: 2025-04-11
Payer: MEDICAID

## 2025-04-11 VITALS
BODY MASS INDEX: 24.09 KG/M2 | RESPIRATION RATE: 19 BRPM | HEIGHT: 65 IN | WEIGHT: 144.6 LBS | SYSTOLIC BLOOD PRESSURE: 119 MMHG | DIASTOLIC BLOOD PRESSURE: 80 MMHG | OXYGEN SATURATION: 97 % | HEART RATE: 98 BPM

## 2025-04-11 DIAGNOSIS — N12 PYELONEPHRITIS: Primary | ICD-10-CM

## 2025-04-11 PROCEDURE — 81001 URINALYSIS AUTO W/SCOPE: CPT | Performed by: NURSE PRACTITIONER

## 2025-04-11 PROCEDURE — 1160F RVW MEDS BY RX/DR IN RCRD: CPT | Performed by: NURSE PRACTITIONER

## 2025-04-11 PROCEDURE — 81025 URINE PREGNANCY TEST: CPT | Performed by: NURSE PRACTITIONER

## 2025-04-11 PROCEDURE — 99213 OFFICE O/P EST LOW 20 MIN: CPT | Performed by: NURSE PRACTITIONER

## 2025-04-11 PROCEDURE — 87086 URINE CULTURE/COLONY COUNT: CPT | Performed by: NURSE PRACTITIONER

## 2025-04-11 PROCEDURE — 1159F MED LIST DOCD IN RCRD: CPT | Performed by: NURSE PRACTITIONER

## 2025-04-11 NOTE — PROGRESS NOTES
[facility]         Referring Provider: No referring provider defined for this encounter.    Subjective      Chief Complaint  Hospital Follow Up Visit (Acute pyelonephritis)    History of Present Illness  Marielena Martínez is a 35 y.o. female who presents today to [facility] for infectious disease evaluation and antibiotic management.    Patient accompanied by significant other today. Overall doing well. No CVA tenderness. Denies fever, chills, body aches. Denies dysuria, urgency, frequency. Completed cefdinir course prescribed upon discharge with no adverse reactions.      Past Medical History:   Diagnosis Date    Anxiety     Arthritis     Injury of back     Migraine        History reviewed. No pertinent surgical history.    Social History     Socioeconomic History    Marital status:    Tobacco Use    Smoking status: Every Day     Current packs/day: 0.50     Types: Cigarettes    Smokeless tobacco: Never   Vaping Use    Vaping status: Every Day    Substances: Nicotine    Devices: Disposable   Substance and Sexual Activity    Alcohol use: No    Drug use: Yes     Types: IV     Comment: oxycodone 30mg iv daily but trying to quit; last intake approx 2wks ago; snorted last 3 days ago    Sexual activity: Never       Family History  family history includes Anxiety disorder in her maternal grandmother and mother; Arthritis in her maternal grandmother and mother; Breast cancer in her maternal grandmother; Heart disease in her paternal grandfather; Hypertension in her maternal grandmother; Lung disease in her paternal grandmother; Stroke in her paternal grandfather.    There is no immunization history for the selected administration types on file for this patient.     Allergies  Allergies   Allergen Reactions    Cephalexin     Tramadol Hcl     Bactrim [Sulfamethoxazole-Trimethoprim] Rash       The medication list has been reviewed and updated.   Current  Medications    Current Outpatient Medications:     ascorbic acid (VITAMIN C) 500 MG tablet, Take 1 tablet by mouth Daily., Disp: , Rfl:     benzonatate (TESSALON) 200 MG capsule, Take 1 capsule by mouth 3 (Three) Times a Day As Needed for Cough., Disp: , Rfl:     buprenorphine-naloxone (SUBOXONE) 8-2 MG film film, Place 1 film under the tongue Daily., Disp: , Rfl:     buPROPion XL (WELLBUTRIN XL) 150 MG 24 hr tablet, Take 1 tablet by mouth Daily., Disp: , Rfl:     calcium carbonate (OS-KIANNA) 600 MG tablet, Take 1 tablet by mouth Daily., Disp: , Rfl:     Cholecalciferol 25 MCG (1000 UT) tablet, Take 1 tablet by mouth Daily., Disp: , Rfl:     docusate sodium 100 MG capsule, Take 1 capsule by mouth 2 (Two) Times a Day., Disp: 60 capsule, Rfl: 0    esomeprazole (nexIUM) 40 MG capsule, Take 1 capsule by mouth Every Morning Before Breakfast., Disp: , Rfl:     fluticasone (FLONASE) 50 MCG/ACT nasal spray, Administer 2 sprays into the nostril(s) as directed by provider Daily., Disp: , Rfl:     levothyroxine (SYNTHROID, LEVOTHROID) 50 MCG tablet, Take 1 tablet by mouth Every Morning., Disp: , Rfl:     magnesium oxide 250 MG tablet, Take 1 tablet by mouth Daily., Disp: , Rfl:     naloxone (NARCAN) 4 MG/0.1ML nasal spray, Administer 1 spray into the nostril(s) as directed by provider As Needed for Opioid Reversal. Call 911. Don't prime. [place] in 1 nostril for overdose. Repeat in 2-3 minutes in other nostril if no or minimal breathing/responsiveness., Disp: , Rfl:     nicotine (NICODERM CQ) 21 MG/24HR patch, Place 1 patch on the skin as directed by provider Daily As Needed (Nicotine Withdrawl)., Disp: , Rfl:     Omega 3 1000 MG capsule, Take 1,000 mg by mouth Daily., Disp: , Rfl:     ondansetron ODT (ZOFRAN-ODT) 4 MG disintegrating tablet, Place 1 tablet on the tongue Every 8 (Eight) Hours As Needed for Nausea or Vomiting., Disp: , Rfl:     polyethylene glycol (MIRALAX) 17 g packet, Take 17 g by mouth Daily., Disp: 30 packet,  "Rfl: 0    Pyridoxine HCl (Vitamin B6) 100 MG tablet, Take 100 mg by mouth Daily., Disp: , Rfl:     thiamine (VITAMIN B-1) 100 MG tablet  tablet, Take 1 tablet by mouth Daily., Disp: , Rfl:     vitamin B-12 (CYANOCOBALAMIN) 500 MCG tablet, Take 1 tablet by mouth Daily., Disp: , Rfl:       Review of Systems    Review of Systems   Constitutional: Negative.    HENT: Negative.     Eyes: Negative.    Respiratory: Negative.     Cardiovascular: Negative.    Gastrointestinal: Negative.    Endocrine: Negative.    Genitourinary: Negative.    Musculoskeletal: Negative.    Skin: Negative.    Allergic/Immunologic: Negative.    Neurological: Negative.    Hematological: Negative.    Psychiatric/Behavioral: Negative.          Objective     Vital Signs:  /80 (BP Location: Right arm, Patient Position: Sitting, Cuff Size: Adult)   Pulse 98   Resp 19   Ht 165.1 cm (65\")   Wt 65.6 kg (144 lb 9.6 oz)   SpO2 97%   BMI 24.06 kg/m²   Estimated body mass index is 24.06 kg/m² as calculated from the following:    Height as of this encounter: 165.1 cm (65\").    Weight as of this encounter: 65.6 kg (144 lb 9.6 oz).    Physical Exam  Constitutional:       Appearance: Normal appearance.   HENT:      Head: Normocephalic.      Nose: Nose normal.      Mouth/Throat:      Mouth: Mucous membranes are moist.   Eyes:      Pupils: Pupils are equal, round, and reactive to light.   Cardiovascular:      Rate and Rhythm: Normal rate and regular rhythm.      Pulses: Normal pulses.      Heart sounds: Normal heart sounds.   Pulmonary:      Effort: Pulmonary effort is normal. No respiratory distress.      Breath sounds: Normal breath sounds.   Abdominal:      General: Bowel sounds are normal.      Palpations: Abdomen is soft.   Musculoskeletal:         General: Normal range of motion.      Cervical back: Normal range of motion.   Skin:     General: Skin is warm and dry.   Neurological:      Mental Status: She is alert and oriented to person, place, and " "time. Mental status is at baseline.   Psychiatric:         Mood and Affect: Mood normal.         Behavior: Behavior normal.         Thought Content: Thought content normal.          Result Review :  The following data was reviewed by HEVER Wilson     Lab Results  Lab Results   Component Value Date    WBC 9.71 03/30/2025    HGB 8.7 (L) 03/30/2025    HCT 28.3 (L) 03/30/2025    MCV 87.9 03/30/2025     03/30/2025     Lab Results   Component Value Date    GLUCOSE 106 (H) 03/30/2025    BUN 19 03/30/2025    CREATININE 1.56 (H) 03/30/2025    EGFRIFNONA >150 11/12/2017    BCR 12.2 03/30/2025    K 3.8 03/30/2025    CO2 21.2 (L) 03/30/2025    CALCIUM 8.5 (L) 03/30/2025    ALBUMIN 1.9 (L) 03/30/2025    AST 21 03/30/2025    ALT 17 03/30/2025      Lab Results   Component Value Date    CRP 17.15 (H) 03/29/2025        No results found for: \"ACANTHNAEG\", \"AFBCX\", \"BPERTUSSISCX\", \"BLOODCX\"  No results found for: \"BCIDPCR\", \"CXREFLEX\", \"CSFCX\", \"CULTURETIS\"  No results found for: \"CULTURES\", \"HSVCX\", \"URCX\"  No results found for: \"EYECULTURE\", \"GCCX\", \"HSVCULTURE\", \"LABHSV\"  No results found for: \"LEGIONELLA\", \"MRSACX\", \"MUMPSCX\", \"MYCOPLASCX\"  No results found for: \"NOCARDIACX\", \"STOOLCX\"  No results found for: \"THROATCX\", \"UNSTIMCULT\", \"URINECX\", \"CULTURE\", \"VZVCULTUR\"  No results found for: \"VIRALCULTU\", \"WOUNDCX\"    Radiology Results  US Renal Bilateral  Result Date: 3/30/2025  Impression: No renal calculus or hydronephrosis.  This report was finalized on 3/30/2025 4:22 PM by Alex Pallas, DO.      US Abdomen Limited  Result Date: 3/28/2025  Impression: 1. Slight enlargement of the right kidney. This may be developmental. However, the findings are not entirely specific. No mass is appreciated. No hydronephrosis is identified either. 2. Slightly contracted but otherwise normal-appearing gallbladder.  This report was finalized on 3/28/2025 1:42 AM by Laureano Armstrong MD.      CT Abdomen Pelvis Without Contrast  Result " Date: 3/28/2025  Impression: 1. Asymmetric enlargement of the right kidney as above. There is also questionable loss of the normal fat within the renal hilum. This may be developmental in nature. However, an infectious process such as pyelonephritis could result in a similar appearance. Recommend close correlation with symptoms and urinalysis results. 2. No bowel obstruction or perforation.  This report was finalized on 3/28/2025 1:38 AM by Laureano Armstrong MD.               Assessment / Plan        Diagnoses and all orders for this visit:    1. Pyelonephritis (Primary)  -     Cancel: CBC & Differential; Future  -     Cancel: C-reactive Protein; Future  -     Urinalysis With Culture If Indicated -; Future  -     Pregnancy, Urine - Urine, Clean Catch; Future  -     Urinalysis With Culture If Indicated -  -     Pregnancy, Urine - Urine, Clean Catch        Due to patient being a hard stick, will repeat urinalysis only. Will do urine pregnancy as patient hasn't had a period since January. Patient has gynecology appointment in May. Patient to be notified of any concerning laboratory results.         Follow Up   Return if symptoms worsen or fail to improve.    Visit Diagnoses:    ICD-10-CM ICD-9-CM   1. Pyelonephritis  N12 590.80       Patient was given instructions and counseling regarding her condition or for health maintenance advice. Please see specific information pulled into the AVS if appropriate.     This document has been electronically signed by HEVER Wilson   April 11, 2025 15:12 EDT      No orders of the defined types were placed in this encounter.     Dictated Utilizing Dragon Dictation: Part of this note may be an electronic transcription/translation of spoken language to printed text using the Dragon Dictation System.

## 2025-04-13 LAB — HCG UR QL: NEGATIVE

## 2025-04-15 LAB
APPEARANCE UR: CLEAR
BACTERIA #/AREA URNS HPF: ABNORMAL /[HPF]
BACTERIA UR CULT: NORMAL
BACTERIA UR CULT: NORMAL
BILIRUB UR QL STRIP: NEGATIVE
CASTS URNS QL MICRO: ABNORMAL /LPF
COLOR UR: YELLOW
EPI CELLS #/AREA URNS HPF: ABNORMAL /HPF (ref 0–10)
GLUCOSE UR QL STRIP: NEGATIVE
HGB UR QL STRIP: NEGATIVE
KETONES UR QL STRIP: NEGATIVE
LEUKOCYTE ESTERASE UR QL STRIP: ABNORMAL
MICRO URNS: ABNORMAL
NITRITE UR QL STRIP: NEGATIVE
PH UR STRIP: 6.5 [PH] (ref 5–7.5)
PROT UR QL STRIP: NEGATIVE
RBC #/AREA URNS HPF: ABNORMAL /HPF (ref 0–2)
SP GR UR STRIP: 1.01 (ref 1–1.03)
URINALYSIS REFLEX: ABNORMAL
UROBILINOGEN UR STRIP-MCNC: 0.2 MG/DL (ref 0.2–1)
WBC #/AREA URNS HPF: ABNORMAL /HPF (ref 0–5)